# Patient Record
Sex: FEMALE | Race: OTHER | Employment: FULL TIME | URBAN - METROPOLITAN AREA
[De-identification: names, ages, dates, MRNs, and addresses within clinical notes are randomized per-mention and may not be internally consistent; named-entity substitution may affect disease eponyms.]

---

## 2018-04-22 PROBLEM — R74.8 ELEVATED ALKALINE PHOSPHATASE LEVEL: Status: ACTIVE | Noted: 2018-04-22

## 2018-04-22 PROBLEM — F32.9 MAJOR DEPRESSION, SINGLE EPISODE: Status: ACTIVE | Noted: 2018-04-22

## 2018-04-22 PROBLEM — E66.01 MORBID OBESITY (HCC): Status: ACTIVE | Noted: 2018-04-22

## 2018-04-22 PROBLEM — F41.9 ANXIETY DISORDER, UNSPECIFIED: Status: ACTIVE | Noted: 2018-04-22

## 2018-04-22 PROBLEM — F32.2 SEVERE MAJOR DEPRESSION, SINGLE EPISODE, WITHOUT PSYCHOTIC FEATURES (HCC): Status: ACTIVE | Noted: 2018-04-22

## 2018-04-22 NOTE — ED PROVIDER NOTES
Patient Seen in: [facility] Emergency Department    History   Patient presents with:  Eval-P (psychiatric)    Stated Complaint: eval p     HPI    Patient is a 43-year-old female comes emergency room for psychiatric evaluation.   Patient apparently is nondistended  SKIN: Warm and dry  NEUROLOGICAL: No focal deficits  PSYCHIATRIC: Good eye contact. Positive suicidal ideation. No homicidal ideation. No hallucinations. Thought process is logical.  Judgment good. Insight good.   Mood depressed    ED Cou is depressed and suicidal.  Patient needs psychiatric stabilization. Certificate filled out and placed on the chart. Patient will be transferred to psychiatric facility.           Disposition and Plan     Clinical Impression:  Suicidal ideation  (primary

## 2018-04-22 NOTE — ED NOTES
Pt accepted as a direct admission to [facility] per Dr Barrow Rear to room 619-A. Report to [id number].

## 2018-04-22 NOTE — ED INITIAL ASSESSMENT (HPI)
Pt ambulatory to er with family cc told family she did not want to be here anymore and took pills - pt denies taking pills. All belongings removed and placed at nurs station.

## 2018-05-15 NOTE — PATIENT INSTRUCTIONS
Thank you for choosing [facility]  To Do:  FOR Sai Tejeda  1.  have blood tests done  2. Follow up in 3 months for annual physical and PAP  3. Contoinue follow up with psychiatry and therapist  4. Have blood tests done  5.  Arrange for US without your doctor’s OK. Physical illness  Being sick can make anyone feel frustrated and sad. But some health problems may cause actual changes in your brain that lead to depression.  Other health problems, such as an underactive thyroid, may be mistaken experience a crisis. Keep the phone number of a crisis hotline and know the location of your community's urgent care centers and the closest emergency department. · Hold off on big decisions. Depression can cloud your judgment.  So wait until you feel bett a healthy weight is higher than the average weight listed on weight charts. Your healthcare provider can help you decide on a healthy weight for you.     Reasons to lose weight  Losing weight can help with some health problems, such as high blood pressure, reserved. This information is not intended as a substitute for professional medical care. Always follow your healthcare professional's instructions. Weight Management: Take It Off and Keep It Off    It’s easy to be motivated when you first start.  Carole Florentino say Tamar Loge you.”  · Make a list of the things that others like about you and that you like about yourself. Add something new from time to time. Keep this list to look at when you need a lift.   Resources  · The [employer] on [address]

## 2018-05-15 NOTE — PROGRESS NOTES
Chief Complaint:   Patient presents with:  Hospital F/U: [facility] 4/21/18, suicidal attemp    HPI:   This is a 32year old female     FF UP RE: RECENT HOSPITALIZATION   Admitted for depression and SI. Admitted for 5+ days. Now in out patient therapy.  Feels be Allergies  Current Meds:    Current Outpatient Prescriptions on File Prior to Visit:  [DISCONTINUED] Sertraline HCl 50 MG Oral Tab Take 1 tablet (50 mg total) by mouth daily.  Disp: 30 tablet Rfl: 0     No current facility-administered medications on file p noted. Insight and judgment are fair. Impulse control is fair.  The patient is cognitively intact            Recent Results (from the past 1008 hour(s))  -RAINBOW DRAW BLUE   Collection Time: 04/21/18  9:43 PM   Result Value Ref Range   Hold Blue Auto Resul Neutrophil Absolute Prelim 5.76 1.30 - 6.70 x10 (3) uL   Neutrophil Absolute 5.76 1.30 - 6.70 x10(3) uL   Lymphocyte Absolute 2.34 0.90 - 4.00 x10(3) uL   Monocyte Absolute 0.53 0.10 - 1.00 x10(3) uL   Eosinophil Absolute 0.12 0.00 - 0.30 x10(3) uL   [facility] ([facility]  -     COMP METABOLIC PANEL (14); Future  -     HEMOGLOBIN A1C; Future  -     LIPID PANEL; Future    Elevated alkaline phosphatase level  -     US ABDOMEN COMPLETE (CPT=76700);  Future    Vitamin D deficiency  -     VITAMIN D, 25-HYDROXY; Future    Hy

## 2018-06-13 ENCOUNTER — APPOINTMENT (OUTPATIENT)
Dept: LAB | Age: 28
End: 2018-06-13
Attending: EMERGENCY MEDICINE
Payer: COMMERCIAL

## 2018-06-13 ENCOUNTER — HOSPITAL ENCOUNTER (OUTPATIENT)
Dept: ULTRASOUND IMAGING | Age: 28
Discharge: HOME | End: 2018-06-13
Attending: EMERGENCY MEDICINE
Payer: COMMERCIAL

## 2018-06-13 DIAGNOSIS — E66.01 MORBID OBESITY (HCC): ICD-10-CM

## 2018-06-13 DIAGNOSIS — E87.6 HYPOKALEMIA: ICD-10-CM

## 2018-06-13 DIAGNOSIS — R74.8 ELEVATED ALKALINE PHOSPHATASE LEVEL: ICD-10-CM

## 2018-06-13 DIAGNOSIS — E55.9 VITAMIN D DEFICIENCY: ICD-10-CM

## 2018-06-13 PROCEDURE — 76700 US EXAM ABDOM COMPLETE: CPT | Performed by: EMERGENCY MEDICINE

## 2018-06-13 PROCEDURE — 82306 VITAMIN D 25 HYDROXY: CPT | Performed by: EMERGENCY MEDICINE

## 2018-06-13 PROCEDURE — 80053 COMPREHEN METABOLIC PANEL: CPT | Performed by: EMERGENCY MEDICINE

## 2018-06-13 PROCEDURE — 80061 LIPID PANEL: CPT | Performed by: EMERGENCY MEDICINE

## 2018-06-13 PROCEDURE — 36415 COLL VENOUS BLD VENIPUNCTURE: CPT | Performed by: EMERGENCY MEDICINE

## 2018-06-13 PROCEDURE — 83036 HEMOGLOBIN GLYCOSYLATED A1C: CPT | Performed by: EMERGENCY MEDICINE

## 2018-06-15 ENCOUNTER — TELEPHONE (OUTPATIENT)
Dept: FAMILY MEDICINE CLINIC | Facility: CLINIC | Age: 28
End: 2018-06-15

## 2018-06-15 PROBLEM — F32.2 MAJOR DEPRESSIVE DISORDER, SINGLE EPISODE, SEVERE WITHOUT PSYCHOSIS (HCC): Status: ACTIVE | Noted: 2018-04-22

## 2018-06-15 NOTE — TELEPHONE ENCOUNTER
----- Message from Michela Sever, DO sent at 6/13/2018  9:46 PM CDT -----  Needs OV in 2 weeks with pcp to address labs and  Treatment plan- prediabetes, low HDL, vit D insufficiency. Please call.       US ABDOMEN COMPLETE (CPT=76700)   Order: [id number]   S

## 2018-06-15 NOTE — TELEPHONE ENCOUNTER
Detailed VM left for patient. I advised her that she should make a follow up appointment in the next 2 weeks to discuss her ultrasound and test results. I explained the test results and the labs including pre-diabetes.  I explained that all results will be

## 2018-07-09 ENCOUNTER — TELEPHONE (OUTPATIENT)
Dept: FAMILY MEDICINE CLINIC | Facility: CLINIC | Age: 28
End: 2018-07-09

## 2018-07-09 NOTE — TELEPHONE ENCOUNTER
LVM for patient that appointment was missed today with  for follow up, no show fee policy $10.21 and phone number hours were left on message.

## 2021-06-18 NOTE — H&P
[address] [facility]  Obstetrics and Gynecology   History & Physical  NEW    Chief complaint: Patient presents with:  Wellness Visit: Pt has been trying to conceive for 4 years now. 2 Miscarriages the last 2 years. Other: Difficulty conceiving.  Mario Asif active. Uses lubricant due to a lot of dryness. Hard time with libido. No dyspareunia. Contraception: withdrawal currently   STDs: no   HPV vaccine - yes     Pap 2+ years ago. No abn pap.    Mammogram - N   Colonoscopy - N     PCP: Sampson Rinne, MD 02/2020 7w0d    SAB   FD      Birth Comments: Passed the tissue on own. D&C.    1 Ectopic 2019              Birth Comments: Left tube. Methotrexate.         Meds:  tretinoin 0.025 % External Cream, APPLY A THIN LAYER TO FACE AND CHEST EVERY NIGHT AT BEDTIME (Medical):       Lack of Transportation (Non-Medical):   Physical Activity:       Days of Exercise per Week:       Minutes of Exercise per Session:   Stress:       Feeling of Stress :   Social Connections:       Frequency of Communication with Friends and exhibits no mass. Left breast exhibits no mass. no palpable masses  Negative nipple discharge or skin changes. Negative axillary lymphadenopathy. Abdominal: Soft. She exhibits no distension and no mass. There is no abdominal tenderness.  There is no rebo B; Future  -     THINPREP PAP SMEAR B; Future  -     HPV HIGH RISK , THIN PREP COLLECTION;  Future    General counseling and advice for contraceptive management    HPV vaccine counseling    History of miscarriage  -     ANTICARDIOLIPIN AB, IGG, QN  -     BE

## 2021-06-19 ENCOUNTER — OFFICE VISIT (OUTPATIENT)
Dept: OBGYN CLINIC | Facility: CLINIC | Age: 31
End: 2021-06-19
Payer: COMMERCIAL

## 2021-06-19 VITALS
WEIGHT: 291 LBS | HEIGHT: 66 IN | HEART RATE: 95 BPM | SYSTOLIC BLOOD PRESSURE: 124 MMHG | BODY MASS INDEX: 46.77 KG/M2 | DIASTOLIC BLOOD PRESSURE: 76 MMHG

## 2021-06-19 DIAGNOSIS — Z71.85 HPV VACCINE COUNSELING: ICD-10-CM

## 2021-06-19 DIAGNOSIS — Z87.59 HISTORY OF MISCARRIAGE: ICD-10-CM

## 2021-06-19 DIAGNOSIS — E66.01 MORBID OBESITY WITH BMI OF 45.0-49.9, ADULT (HCC): ICD-10-CM

## 2021-06-19 DIAGNOSIS — Z30.09 GENERAL COUNSELING AND ADVICE FOR CONTRACEPTIVE MANAGEMENT: ICD-10-CM

## 2021-06-19 DIAGNOSIS — N97.9 INFERTILITY, FEMALE, SECONDARY: ICD-10-CM

## 2021-06-19 DIAGNOSIS — F32.A DEPRESSION, UNSPECIFIED DEPRESSION TYPE: ICD-10-CM

## 2021-06-19 DIAGNOSIS — L83 ACANTHOSIS NIGRICANS: ICD-10-CM

## 2021-06-19 DIAGNOSIS — Z01.419 WELL WOMAN EXAM WITH ROUTINE GYNECOLOGICAL EXAM: Primary | ICD-10-CM

## 2021-06-19 DIAGNOSIS — Z12.4 SCREENING FOR CERVICAL CANCER: ICD-10-CM

## 2021-06-19 DIAGNOSIS — F43.21 GRIEF REACTION: ICD-10-CM

## 2021-06-19 DIAGNOSIS — F41.9 ANXIETY: ICD-10-CM

## 2021-06-19 DIAGNOSIS — N92.0 MENORRHAGIA WITH REGULAR CYCLE: ICD-10-CM

## 2021-06-19 PROCEDURE — 87491 CHLMYD TRACH DNA AMP PROBE: CPT | Performed by: OBSTETRICS & GYNECOLOGY

## 2021-06-19 PROCEDURE — 87591 N.GONORRHOEAE DNA AMP PROB: CPT | Performed by: OBSTETRICS & GYNECOLOGY

## 2021-06-19 PROCEDURE — 3074F SYST BP LT 130 MM HG: CPT | Performed by: OBSTETRICS & GYNECOLOGY

## 2021-06-19 PROCEDURE — 88175 CYTOPATH C/V AUTO FLUID REDO: CPT | Performed by: OBSTETRICS & GYNECOLOGY

## 2021-06-19 PROCEDURE — 87624 HPV HI-RISK TYP POOLED RSLT: CPT | Performed by: OBSTETRICS & GYNECOLOGY

## 2021-06-19 PROCEDURE — 3008F BODY MASS INDEX DOCD: CPT | Performed by: OBSTETRICS & GYNECOLOGY

## 2021-06-19 PROCEDURE — 81025 URINE PREGNANCY TEST: CPT | Performed by: OBSTETRICS & GYNECOLOGY

## 2021-06-19 PROCEDURE — 99385 PREV VISIT NEW AGE 18-39: CPT | Performed by: OBSTETRICS & GYNECOLOGY

## 2021-06-19 PROCEDURE — 99204 OFFICE O/P NEW MOD 45 MIN: CPT | Performed by: OBSTETRICS & GYNECOLOGY

## 2021-06-19 PROCEDURE — 3078F DIAST BP <80 MM HG: CPT | Performed by: OBSTETRICS & GYNECOLOGY

## 2021-06-19 RX ORDER — MINOCYCLINE HYDROCHLORIDE 100 MG/1
100 CAPSULE ORAL 2 TIMES DAILY
COMMUNITY
Start: 2021-05-24

## 2021-06-19 RX ORDER — TRETINOIN 0.025 %
CREAM (GRAM) TOPICAL
COMMUNITY
Start: 2021-05-24 | End: 2021-09-03

## 2021-06-19 NOTE — PATIENT INSTRUCTIONS
Preconception advice:     Track all periods & menstrual bleeding  Can try using an over the counter ovulation predictor kit to see if it looks like you are ovulating, or you can have a day 21-24 progesterone level drawn through the lab.      Please have you

## 2021-07-07 ENCOUNTER — HOSPITAL ENCOUNTER (OUTPATIENT)
Dept: ULTRASOUND IMAGING | Age: 31
Discharge: HOME | End: 2021-07-07
Attending: OBSTETRICS & GYNECOLOGY
Payer: COMMERCIAL

## 2021-07-07 DIAGNOSIS — N97.9 INFERTILITY, FEMALE, SECONDARY: ICD-10-CM

## 2021-07-07 PROBLEM — N83.201 RIGHT OVARIAN CYST: Status: ACTIVE | Noted: 2021-07-07

## 2021-07-07 PROCEDURE — 3044F HG A1C LEVEL LT 7.0%: CPT | Performed by: FAMILY MEDICINE

## 2021-07-07 PROCEDURE — 76830 TRANSVAGINAL US NON-OB: CPT | Performed by: OBSTETRICS & GYNECOLOGY

## 2021-07-07 PROCEDURE — 76856 US EXAM PELVIC COMPLETE: CPT | Performed by: OBSTETRICS & GYNECOLOGY

## 2021-07-07 PROCEDURE — 93975 VASCULAR STUDY: CPT | Performed by: OBSTETRICS & GYNECOLOGY

## 2021-07-08 NOTE — PROGRESS NOTES
Pelvic US images & report reviewed. Pelvic US done on 7/7/2021 would have been cycle day 4 by given LMP 7/3/2021. There is a 3.4 cm simple cyst on the right ovary.  This is discordant from the period and is suggestive of ovulatory dysfunction & anovulatory

## 2021-07-08 NOTE — PROGRESS NOTES
See previous note. Would also recheck pelvic US in about 6-8 wk to make sure right ovarian cyst resolves.

## 2021-07-09 NOTE — PROGRESS NOTES
Bola Hudson,   Please call the office to go over your pelvic ultrasound results. Our number is [phone].    Thanks,   John Diggs RN

## 2021-07-10 LAB
ABSOLUTE BASOPHILS: 30 CELLS/UL (ref 0–200)
ABSOLUTE EOSINOPHILS: 122 CELLS/UL (ref 15–500)
ABSOLUTE LYMPHOCYTES: 1832 CELLS/UL (ref 850–3900)
ABSOLUTE MONOCYTES: 365 CELLS/UL (ref 200–950)
ABSOLUTE NEUTROPHILS: 5252 CELLS/UL (ref 1500–7800)
ALBUMIN/GLOBULIN RATIO: 1.1 (CALC) (ref 1–2.5)
ALBUMIN: 4.1 G/DL (ref 3.6–5.1)
ALKALINE PHOSPHATASE: 98 U/L (ref 31–125)
ALT: 64 U/L (ref 6–29)
ANTI-MULLERIAN HORMONE (AMH), FEMALE: 1.1 NG/ML (ref 0.36–10.07)
AST: 36 U/L (ref 10–30)
B2 GLYCOPROTEIN I (IGG)AB: <2 U/ML
BASOPHILS: 0.4 %
BILIRUBIN, TOTAL: 0.6 MG/DL (ref 0.2–1.2)
BUN: 9 MG/DL (ref 7–25)
CALCIUM: 9.2 MG/DL (ref 8.6–10.2)
CARBON DIOXIDE: 27 MMOL/L (ref 20–32)
CARDIOLIPIN AB (IGG): <2 GPL-U/ML
CHLORIDE: 102 MMOL/L (ref 98–110)
CHOL/HDLC RATIO: 5.6 (CALC)
CHOLESTEROL, TOTAL: 167 MG/DL
CREATININE: 0.66 MG/DL (ref 0.5–1.1)
DHEA SULFATE: 96 MCG/DL (ref 23–266)
DRVVT SCREEN: 32 SEC
EGFR IF AFRICN AM: 135 ML/MIN/1.73M2
EGFR IF NONAFRICN AM: 117 ML/MIN/1.73M2
EOSINOPHILS: 1.6 %
FREE TESTOSTERONE: 1 PG/ML (ref 0.1–6.4)
FSH: 5.9 MIU/ML
GLOBULIN: 3.6 G/DL (CALC) (ref 1.9–3.7)
GLUCOSE: 95 MG/DL (ref 65–99)
HDL CHOLESTEROL: 30 MG/DL
HEMATOCRIT: 38.5 % (ref 35–45)
HEMOGLOBIN A1C: 5.7 % OF TOTAL HGB
HEMOGLOBIN: 12.3 G/DL (ref 11.7–15.5)
LDL-CHOLESTEROL: 111 MG/DL (CALC)
LYMPHOCYTES: 24.1 %
MCH: 23.9 PG (ref 27–33)
MCHC: 31.9 G/DL (ref 32–36)
MCV: 74.8 FL (ref 80–100)
MONOCYTES: 4.8 %
MPV: 11.5 FL (ref 7.5–12.5)
NEUTROPHILS: 69.1 %
NON-HDL CHOLESTEROL: 137 MG/DL (CALC)
PLATELET COUNT: 309 THOUSAND/UL (ref 140–400)
POTASSIUM: 4 MMOL/L (ref 3.5–5.3)
PROGESTERONE: <0.5 NG/ML
PROLACTIN: 10.5 NG/ML
PROTEIN, TOTAL: 7.7 G/DL (ref 6.1–8.1)
PTT-LA SCREEN: 34 SEC
RDW: 15.7 % (ref 11–15)
RED BLOOD CELL COUNT: 5.15 MILLION/UL (ref 3.8–5.1)
SODIUM: 137 MMOL/L (ref 135–146)
TESTOSTERONE, TOTAL,$/MS/MS: 8 NG/DL (ref 2–45)
TRIGLYCERIDES: 148 MG/DL
TSH W/REFLEX TO FT4: 1.94 MIU/L
WHITE BLOOD CELL COUNT: 7.6 THOUSAND/UL (ref 3.8–10.8)

## 2021-07-12 ENCOUNTER — TELEPHONE (OUTPATIENT)
Dept: OBGYN CLINIC | Facility: CLINIC | Age: 31
End: 2021-07-12

## 2021-07-12 PROBLEM — R73.03 PREDIABETES: Status: ACTIVE | Noted: 2021-07-07

## 2021-07-12 PROBLEM — R79.89 ELEVATED LFTS: Status: ACTIVE | Noted: 2021-07-07

## 2021-07-12 PROBLEM — E78.5 DYSLIPIDEMIA: Status: ACTIVE | Noted: 2021-07-07

## 2021-07-12 NOTE — PROGRESS NOTES
Can start metformin. Will send Rx to pharmacy. Endometrial biopsy is just done in office. Will also send Rx Provera (a progestin) to take for 10 days each month to induce a withdrawal bleed to shed the lining.

## 2021-07-12 NOTE — PROGRESS NOTES
Prediabetes noted. Elevated liver enzymes. High LDL & low HDL cholesterol. Rest of labs are ok. See primary care physician. Start low carb/diabetic diet.

## 2021-07-12 NOTE — PROGRESS NOTES
Spoke with patient regarding results. She had blood work completed. Patient aware of blood test results and recommendation for diet, exercise and follow up with PCP regarding prediabetes, high LDL and elevated liver enzymes.  Patient verbalizes understandin

## 2021-07-13 ENCOUNTER — TELEPHONE (OUTPATIENT)
Dept: OBGYN CLINIC | Facility: CLINIC | Age: 31
End: 2021-07-13

## 2021-07-13 NOTE — PROGRESS NOTES
Spoke with patient let her know that Metformin and Provera would be available for her to  from the pharmacy. Advised that the endometrial biopsy can be done in the office. Patient verbalized understanding.  [facility]

## 2021-07-23 ENCOUNTER — TELEPHONE (OUTPATIENT)
Dept: INTERNAL MEDICINE CLINIC | Facility: CLINIC | Age: 31
End: 2021-07-23

## 2021-09-03 ENCOUNTER — OFFICE VISIT (OUTPATIENT)
Dept: FAMILY MEDICINE CLINIC | Facility: CLINIC | Age: 31
End: 2021-09-03
Payer: COMMERCIAL

## 2021-09-03 VITALS
HEART RATE: 73 BPM | DIASTOLIC BLOOD PRESSURE: 72 MMHG | WEIGHT: 290.25 LBS | HEIGHT: 66 IN | TEMPERATURE: 97 F | BODY MASS INDEX: 46.65 KG/M2 | RESPIRATION RATE: 16 BRPM | SYSTOLIC BLOOD PRESSURE: 110 MMHG | OXYGEN SATURATION: 99 %

## 2021-09-03 DIAGNOSIS — E66.01 MORBID OBESITY WITH BMI OF 45.0-49.9, ADULT (HCC): ICD-10-CM

## 2021-09-03 DIAGNOSIS — R73.03 PREDIABETES: ICD-10-CM

## 2021-09-03 DIAGNOSIS — R79.89 ELEVATED LFTS: Primary | ICD-10-CM

## 2021-09-03 DIAGNOSIS — E78.5 BORDERLINE HYPERLIPIDEMIA: ICD-10-CM

## 2021-09-03 PROBLEM — O00.90 ECTOPIC PREGNANCY (HCC): Status: ACTIVE | Noted: 2021-09-03

## 2021-09-03 PROBLEM — O00.90 ECTOPIC PREGNANCY: Status: ACTIVE | Noted: 2021-09-03

## 2021-09-03 PROBLEM — E78.00 HYPERCHOLESTEROLEMIA: Status: ACTIVE | Noted: 2021-09-03

## 2021-09-03 PROBLEM — E11.9 DIABETES MELLITUS (HCC): Status: ACTIVE | Noted: 2021-09-03

## 2021-09-03 PROCEDURE — 3078F DIAST BP <80 MM HG: CPT | Performed by: FAMILY MEDICINE

## 2021-09-03 PROCEDURE — 3008F BODY MASS INDEX DOCD: CPT | Performed by: FAMILY MEDICINE

## 2021-09-03 PROCEDURE — 3074F SYST BP LT 130 MM HG: CPT | Performed by: FAMILY MEDICINE

## 2021-09-03 PROCEDURE — 99214 OFFICE O/P EST MOD 30 MIN: CPT | Performed by: FAMILY MEDICINE

## 2021-09-03 NOTE — PATIENT INSTRUCTIONS
Controlling Your Cholesterol  Cholesterol is a waxy substance. It travels in your blood through the blood vessels. When you have high cholesterol, it can build up along the walls of the blood vessels.  This makes the vessels narrower and decreases blood f levels, another form of fat in the blood. If you are pregnant or thinking of becoming pregnant or are breastfeeding, talk with your healthcare provider for advice about the best fish choices and how much to eat.   · Eat more whole grains and soluble fiber ( for developing type 2 diabetes. Type 2 diabetes is diagnosed when the level of glucose in the blood reaches a certain high level. With prediabetes, it hasn’t reached this point yet.  But it's higher than normal. It is vital to make lifestyle changes to lowe normal test result is 139 milligrams per deciliter (mg/dL) or lower. Prediabetes is 140 mg/dL to 199 mg/dL. Diabetes is 200 mg/dL or higher. · Hemoglobin A1c (HbA1c). Your HbA1c is normal if it is below 5.7%. Prediabetes is 5.7% to 6.4%.  Diabetes is 6.5% last reviewed this educational content on 6/1/2019  © 0516-2605 [employer]. All rights reserved. This information is not intended as a substitute for professional medical care. Always follow your healthcare professional's instructions.

## 2021-09-03 NOTE — PROGRESS NOTES
HPI/Subjective:   Patient ID: Teresa Lemus is a 33year old female. HPI   31yr obese, female presents as a new patient to f/u on labs done by her gyne.    Prediabetes, was started on metformin by her gyne and states she is taking metformin 1500mg p Pulmonary effort is normal.      Breath sounds: Normal breath sounds. Abdominal:      General: Bowel sounds are normal.      Palpations: Abdomen is soft. Tenderness: There is no abdominal tenderness. There is no guarding or rebound.       Comments: B

## 2021-09-24 LAB
ALBUMIN/GLOBULIN RATIO: 1.2 (CALC) (ref 1–2.5)
ALBUMIN: 4.2 G/DL (ref 3.6–5.1)
ALKALINE PHOSPHATASE: 94 U/L (ref 31–125)
ALT: 74 U/L (ref 6–29)
AST: 36 U/L (ref 10–30)
BILIRUBIN, DIRECT: 0.1 MG/DL
BILIRUBIN, INDIRECT: 0.6 MG/DL (CALC) (ref 0.2–1.2)
BILIRUBIN, TOTAL: 0.7 MG/DL (ref 0.2–1.2)
GLOBULIN: 3.4 G/DL (CALC) (ref 1.9–3.7)
PROTEIN, TOTAL: 7.6 G/DL (ref 6.1–8.1)
SIGNAL TO CUT-OFF: 0.01

## 2023-10-16 ENCOUNTER — OFFICE VISIT (OUTPATIENT)
Dept: OBGYN CLINIC | Facility: CLINIC | Age: 33
End: 2023-10-16

## 2023-10-16 VITALS
WEIGHT: 260.38 LBS | BODY MASS INDEX: 41.85 KG/M2 | HEIGHT: 66 IN | SYSTOLIC BLOOD PRESSURE: 122 MMHG | DIASTOLIC BLOOD PRESSURE: 70 MMHG

## 2023-10-16 DIAGNOSIS — O09.291 PRIOR MISCARRIAGE WITH PREGNANCY IN FIRST TRIMESTER, ANTEPARTUM: ICD-10-CM

## 2023-10-16 DIAGNOSIS — O09.11 PRIOR ECTOPIC PREGNANCY, ANTEPARTUM, FIRST TRIMESTER: ICD-10-CM

## 2023-10-16 DIAGNOSIS — N92.6 MISSED MENSES: Primary | ICD-10-CM

## 2023-10-16 DIAGNOSIS — N91.2 AMENORRHEA: ICD-10-CM

## 2023-10-16 LAB
CONTROL LINE PRESENT WITH A CLEAR BACKGROUND (YES/NO): YES YES/NO
KIT LOT #: NORMAL NUMERIC
PREGNANCY TEST, URINE: POSITIVE

## 2023-10-16 PROCEDURE — 3008F BODY MASS INDEX DOCD: CPT | Performed by: OBSTETRICS & GYNECOLOGY

## 2023-10-16 PROCEDURE — 3078F DIAST BP <80 MM HG: CPT | Performed by: OBSTETRICS & GYNECOLOGY

## 2023-10-16 PROCEDURE — 99204 OFFICE O/P NEW MOD 45 MIN: CPT | Performed by: OBSTETRICS & GYNECOLOGY

## 2023-10-16 PROCEDURE — 3074F SYST BP LT 130 MM HG: CPT | Performed by: OBSTETRICS & GYNECOLOGY

## 2023-10-16 PROCEDURE — 81025 URINE PREGNANCY TEST: CPT | Performed by: OBSTETRICS & GYNECOLOGY

## 2023-10-16 RX ORDER — ASCORBIC ACID, CHOLECALCIFEROL, .ALPHA.-TOCOPHEROL ACETATE, DL-, PYRIDOXINE HYDROCHLORIDE, FOLIC ACID, CYANOCOBALAMIN, BIOTIN, CALCIUM CARBONATE, FERROUS ASPARTO GLYCINATE, IRON, POTASSIUM IODIDE, MAGNESIUM OXIDE, DOCONEXENT AND LOWBUSH BLUEBERRY 60; 1000; 10; 26; 400; 13; 280; 80; 9; 9; 150; 25; 350; 25; 600 MG/1; [IU]/1; [IU]/1; MG/1; UG/1; UG/1; UG/1; MG/1; MG/1; MG/1; UG/1; MG/1; MG/1; MG/1; UG/1
1 CAPSULE, GELATIN COATED ORAL DAILY
COMMUNITY
Start: 2023-10-12

## 2023-10-16 RX ORDER — ERGOCALCIFEROL 1.25 MG/1
50000 CAPSULE ORAL WEEKLY
COMMUNITY
Start: 2023-09-28

## 2023-10-17 ENCOUNTER — TELEPHONE (OUTPATIENT)
Dept: OBGYN CLINIC | Facility: CLINIC | Age: 33
End: 2023-10-17

## 2023-10-17 DIAGNOSIS — Z87.59 HISTORY OF ECTOPIC PREGNANCY: Primary | ICD-10-CM

## 2023-10-17 DIAGNOSIS — N92.6 MISSED MENSES: ICD-10-CM

## 2023-10-17 NOTE — TELEPHONE ENCOUNTER
Pt was seen yesterday and told to schedule an US, pt called to schedule and no order.  Please advise

## 2023-10-31 ENCOUNTER — ROUTINE PRENATAL (OUTPATIENT)
Dept: OBGYN CLINIC | Facility: CLINIC | Age: 33
End: 2023-10-31
Payer: COMMERCIAL

## 2023-10-31 ENCOUNTER — HOSPITAL ENCOUNTER (OUTPATIENT)
Dept: ULTRASOUND IMAGING | Age: 33
Discharge: HOME | End: 2023-10-31
Attending: OBSTETRICS & GYNECOLOGY
Payer: COMMERCIAL

## 2023-10-31 ENCOUNTER — NURSE ONLY (OUTPATIENT)
Dept: OBGYN CLINIC | Facility: CLINIC | Age: 33
End: 2023-10-31

## 2023-10-31 VITALS — DIASTOLIC BLOOD PRESSURE: 78 MMHG | BODY MASS INDEX: 45 KG/M2 | SYSTOLIC BLOOD PRESSURE: 124 MMHG | WEIGHT: 280 LBS

## 2023-10-31 DIAGNOSIS — Z32.01 PREGNANCY TEST POSITIVE: Primary | ICD-10-CM

## 2023-10-31 DIAGNOSIS — N92.6 MISSED MENSES: ICD-10-CM

## 2023-10-31 DIAGNOSIS — Z34.81 ENCOUNTER FOR SUPERVISION OF OTHER NORMAL PREGNANCY IN FIRST TRIMESTER: Primary | ICD-10-CM

## 2023-10-31 DIAGNOSIS — Z87.59 HISTORY OF ECTOPIC PREGNANCY: ICD-10-CM

## 2023-10-31 PROCEDURE — 76801 OB US < 14 WKS SINGLE FETUS: CPT | Performed by: OBSTETRICS & GYNECOLOGY

## 2023-10-31 NOTE — PROGRESS NOTES
Narrative   PROCEDURE:  US PREG 1ST TRIMESTER (CPT=76801)     COMPARISON:  None. INDICATIONS:  N92.6 Missed menses Z87.59 History of ectopic pregnancy     TECHNIQUE:  Transabdominal pelvic ultrasound examination was performed. PATIENT STATED HISTORY: (As transcribed by Technologist)  Patient states missed menses. FINDINGS:    GESTATIONAL SAC:  Present and normal appearing. FETAL POLE:  Present and normal appearing. YOLK SAC:  Present. CARDIAC ACTIVITY:  Present. UTERUS:  Normal.     RIGHT OVARY:  Normal.  LEFT OVARY:  Normal.  CUL-DE-SAC:  Normal.  CLINICAL AGE:  7 weeks 3 days  SONOGRAPHIC AGE:  7 weeks 0 days  OTHER:  Negative. Impression   CONCLUSION:  Single live intrauterine gestation with sonographic age of 9 weeks, 0 days by crown-rump length. LOCATION:  [id number]           Dictated by (CST): Ruben Hutchinson MD on 10/31/2023 at 2:35 PM      Finalized by (CST): Ruben Hutchinson MD on 10/31/2023 at 2:43 PM       Pregnancy counseilng done. All questions answered. Pt considering genetic testing. Hx of ectopic pregnancy. prior pregnancy

## 2023-10-31 NOTE — PROGRESS NOTES
Pt called today for RN [facility] Education. Missed menses apt with: ANITA  LMP: 2023    Pre  BMI: 41.99  EPDS score: 3/30- Declined resources  +UPT at home:  10/7/2023  +UPT in office: 10/16    US: Scheduled  Working TALIA: 2024  Hx of genetic abnormality in family: Denies  Hx of varicella: Chicken Pox in Childhood  Flu Vaccine: Interested  Covid Vaccine: x2     Consent (if needed): n/a      Sterilization/Contraception: Declined     OUD Screening: Pt. Has answered NO 5P questions and has NO  risk factors. Pt. Given What pregnant women need to know handout. Educational material reviewed with patient: Prenatal care, nutrition, weight gain recommendations, travel, exercise, intercourse, pregnancy changes, safe medications, pregnancy and work, fetal movement, labor and  labor, warning signs, food safety, tdap, cord blood, breastfeeding- Breast feeding, circumcision- undecided, and Group B strep. Blood transfusion if needed: Consents  PN labs: Entered    Optional genetic screening labs were reviewed: Kate Naqvi, FTS with US, Quad screen MSAFP and CF screening.    - MFM FTS  [employer] Media Policy: Discussed    NOB apt:  Today

## 2023-12-08 ENCOUNTER — LAB ENCOUNTER (OUTPATIENT)
Dept: LAB | Facility: HOSPITAL | Age: 33
End: 2023-12-08
Attending: OBSTETRICS & GYNECOLOGY
Payer: COMMERCIAL

## 2023-12-08 ENCOUNTER — HOSPITAL ENCOUNTER (OUTPATIENT)
Dept: PERINATAL CARE | Facility: HOSPITAL | Age: 33
Discharge: HOME | End: 2023-12-08
Attending: OBSTETRICS & GYNECOLOGY
Payer: COMMERCIAL

## 2023-12-08 ENCOUNTER — ROUTINE PRENATAL (OUTPATIENT)
Dept: OBGYN CLINIC | Facility: CLINIC | Age: 33
End: 2023-12-08
Payer: COMMERCIAL

## 2023-12-08 VITALS
HEART RATE: 82 BPM | SYSTOLIC BLOOD PRESSURE: 112 MMHG | DIASTOLIC BLOOD PRESSURE: 50 MMHG | BODY MASS INDEX: 43 KG/M2 | WEIGHT: 264 LBS

## 2023-12-08 VITALS — DIASTOLIC BLOOD PRESSURE: 80 MMHG | WEIGHT: 265 LBS | SYSTOLIC BLOOD PRESSURE: 120 MMHG | BODY MASS INDEX: 43 KG/M2

## 2023-12-08 DIAGNOSIS — E66.01 MATERNAL MORBID OBESITY IN FIRST TRIMESTER, ANTEPARTUM (HCC): ICD-10-CM

## 2023-12-08 DIAGNOSIS — F32.89 OTHER DEPRESSION: ICD-10-CM

## 2023-12-08 DIAGNOSIS — Z36.9 FIRST TRIMESTER SCREENING: ICD-10-CM

## 2023-12-08 DIAGNOSIS — O99.211 MATERNAL MORBID OBESITY IN FIRST TRIMESTER, ANTEPARTUM (HCC): ICD-10-CM

## 2023-12-08 DIAGNOSIS — Z34.81 ENCOUNTER FOR SUPERVISION OF OTHER NORMAL PREGNANCY IN FIRST TRIMESTER: ICD-10-CM

## 2023-12-08 DIAGNOSIS — O36.80X0 PREGNANCY WITH UNCERTAIN FETAL VIABILITY, SINGLE OR UNSPECIFIED FETUS: ICD-10-CM

## 2023-12-08 DIAGNOSIS — O09.521 MULTIGRAVIDA OF ADVANCED MATERNAL AGE IN FIRST TRIMESTER: Primary | ICD-10-CM

## 2023-12-08 DIAGNOSIS — E66.01 MORBID OBESITY WITH BMI OF 45.0-49.9, ADULT (HCC): ICD-10-CM

## 2023-12-08 DIAGNOSIS — Z34.81 ENCOUNTER FOR SUPERVISION OF OTHER NORMAL PREGNANCY IN FIRST TRIMESTER: Primary | ICD-10-CM

## 2023-12-08 DIAGNOSIS — R73.03 PREDIABETES: ICD-10-CM

## 2023-12-08 LAB
ANTIBODY SCREEN: NEGATIVE
BASOPHILS # BLD AUTO: 0.02 X10(3) UL (ref 0–0.2)
BASOPHILS NFR BLD AUTO: 0.2 %
BILIRUB UR QL: NEGATIVE
CLARITY UR: CLEAR
DEPRECATED HBV CORE AB SER IA-ACNC: 25.3 NG/ML
DEPRECATED RDW RBC AUTO: 40.4 FL (ref 35.1–46.3)
EOSINOPHIL # BLD AUTO: 0.07 X10(3) UL (ref 0–0.7)
EOSINOPHIL NFR BLD AUTO: 0.7 %
ERYTHROCYTE [DISTWIDTH] IN BLOOD BY AUTOMATED COUNT: 14.8 % (ref 11–15)
GLUCOSE UR-MCNC: NORMAL MG/DL
HBV SURFACE AG SER-ACNC: <0.1 [IU]/L
HBV SURFACE AG SERPL QL IA: NONREACTIVE
HCT VFR BLD AUTO: 36.9 %
HCV AB SERPL QL IA: NONREACTIVE
HGB BLD-MCNC: 12.3 G/DL
HGB UR QL STRIP.AUTO: NEGATIVE
IMM GRANULOCYTES # BLD AUTO: 0.02 X10(3) UL (ref 0–1)
IMM GRANULOCYTES NFR BLD: 0.2 %
KETONES UR-MCNC: NEGATIVE MG/DL
LEUKOCYTE ESTERASE UR QL STRIP.AUTO: 250
LYMPHOCYTES # BLD AUTO: 1.78 X10(3) UL (ref 1–4)
LYMPHOCYTES NFR BLD AUTO: 18.9 %
MCH RBC QN AUTO: 25.1 PG (ref 26–34)
MCHC RBC AUTO-ENTMCNC: 33.3 G/DL (ref 31–37)
MCV RBC AUTO: 75.2 FL
MONOCYTES # BLD AUTO: 0.5 X10(3) UL (ref 0.1–1)
MONOCYTES NFR BLD AUTO: 5.3 %
NEUTROPHILS # BLD AUTO: 7.03 X10 (3) UL (ref 1.5–7.7)
NEUTROPHILS # BLD AUTO: 7.03 X10(3) UL (ref 1.5–7.7)
NEUTROPHILS NFR BLD AUTO: 74.7 %
NITRITE UR QL STRIP.AUTO: NEGATIVE
PH UR: 5.5 [PH] (ref 5–8)
PLATELET # BLD AUTO: 272 10(3)UL (ref 150–450)
PROT UR-MCNC: NEGATIVE MG/DL
RBC # BLD AUTO: 4.91 X10(6)UL
RH BLOOD TYPE: POSITIVE
RUBV IGG SER QL: POSITIVE
RUBV IGG SER-ACNC: 23 IU/ML (ref 10–?)
SP GR UR STRIP: 1.01 (ref 1–1.03)
TSI SER-ACNC: 1.58 MIU/ML (ref 0.55–4.78)
UROBILINOGEN UR STRIP-ACNC: NORMAL
WBC # BLD AUTO: 9.4 X10(3) UL (ref 4–11)

## 2023-12-08 PROCEDURE — 87624 HPV HI-RISK TYP POOLED RSLT: CPT | Performed by: OBSTETRICS & GYNECOLOGY

## 2023-12-08 PROCEDURE — 87077 CULTURE AEROBIC IDENTIFY: CPT

## 2023-12-08 PROCEDURE — 36415 COLL VENOUS BLD VENIPUNCTURE: CPT

## 2023-12-08 PROCEDURE — 87491 CHLMYD TRACH DNA AMP PROBE: CPT | Performed by: OBSTETRICS & GYNECOLOGY

## 2023-12-08 PROCEDURE — 86762 RUBELLA ANTIBODY: CPT

## 2023-12-08 PROCEDURE — 87591 N.GONORRHOEAE DNA AMP PROB: CPT | Performed by: OBSTETRICS & GYNECOLOGY

## 2023-12-08 PROCEDURE — 86900 BLOOD TYPING SEROLOGIC ABO: CPT

## 2023-12-08 PROCEDURE — 85025 COMPLETE CBC W/AUTO DIFF WBC: CPT

## 2023-12-08 PROCEDURE — 86780 TREPONEMA PALLIDUM: CPT

## 2023-12-08 PROCEDURE — 87340 HEPATITIS B SURFACE AG IA: CPT

## 2023-12-08 PROCEDURE — 76801 OB US < 14 WKS SINGLE FETUS: CPT | Performed by: OBSTETRICS & GYNECOLOGY

## 2023-12-08 PROCEDURE — 87186 SC STD MICRODIL/AGAR DIL: CPT

## 2023-12-08 PROCEDURE — 86850 RBC ANTIBODY SCREEN: CPT

## 2023-12-08 PROCEDURE — 81001 URINALYSIS AUTO W/SCOPE: CPT

## 2023-12-08 PROCEDURE — 87389 HIV-1 AG W/HIV-1&-2 AB AG IA: CPT

## 2023-12-08 PROCEDURE — 86803 HEPATITIS C AB TEST: CPT

## 2023-12-08 PROCEDURE — 87086 URINE CULTURE/COLONY COUNT: CPT

## 2023-12-08 PROCEDURE — 84443 ASSAY THYROID STIM HORMONE: CPT

## 2023-12-08 PROCEDURE — 86901 BLOOD TYPING SEROLOGIC RH(D): CPT

## 2023-12-08 PROCEDURE — 82728 ASSAY OF FERRITIN: CPT

## 2023-12-08 NOTE — PROGRESS NOTES
Pregnnacy counseling. New ob    Unable to obtain fhts by doppler, pt anxious, pt requested to check for viability,      Fhts 140,  pt reassured.

## 2023-12-09 ENCOUNTER — TELEPHONE (OUTPATIENT)
Dept: OBGYN CLINIC | Facility: CLINIC | Age: 33
End: 2023-12-09

## 2023-12-09 RX ORDER — AMOXICILLIN 500 MG/1
500 CAPSULE ORAL 3 TIMES DAILY
Qty: 21 CAPSULE | Refills: 0 | Status: SHIPPED | OUTPATIENT
Start: 2023-12-09 | End: 2023-12-16

## 2023-12-09 NOTE — TELEPHONE ENCOUNTER
E coli uti noted, nkda,  rx amox 500  mg tid for 7 days sent. Called pt and left message. Please try calling pt again.

## 2023-12-11 LAB
C TRACH DNA SPEC QL NAA+PROBE: NEGATIVE
HPV I/H RISK 1 DNA SPEC QL NAA+PROBE: NEGATIVE
N GONORRHOEA DNA SPEC QL NAA+PROBE: NEGATIVE
T PALLIDUM AB SER QL: NEGATIVE

## 2023-12-14 ENCOUNTER — TELEPHONE (OUTPATIENT)
Dept: PERINATAL CARE | Facility: HOSPITAL | Age: 33
End: 2023-12-14

## 2023-12-14 NOTE — TELEPHONE ENCOUNTER
Panorama screening results obt  Reviewed by KYLAH CADENA    Results:  low risk  Low Risk for Aneuploidies, triploidy and Monosomy X  Fetal Sex: female  Fetal Fraction: 4.0      My chart message sent  Copy of results sent for scanning into pt record

## 2023-12-28 ENCOUNTER — APPOINTMENT (OUTPATIENT)
Dept: ULTRASOUND IMAGING | Facility: HOSPITAL | Age: 33
End: 2023-12-28
Payer: COMMERCIAL

## 2023-12-28 ENCOUNTER — HOSPITAL ENCOUNTER (EMERGENCY)
Facility: HOSPITAL | Age: 33
Discharge: LEFT BEFORE DISCHARGE | End: 2023-12-28
Payer: COMMERCIAL

## 2023-12-28 VITALS
HEIGHT: 66 IN | OXYGEN SATURATION: 98 % | SYSTOLIC BLOOD PRESSURE: 122 MMHG | RESPIRATION RATE: 16 BRPM | TEMPERATURE: 98 F | HEART RATE: 84 BPM | DIASTOLIC BLOOD PRESSURE: 83 MMHG | BODY MASS INDEX: 42.59 KG/M2 | WEIGHT: 265 LBS

## 2023-12-28 PROCEDURE — 76815 OB US LIMITED FETUS(S): CPT

## 2023-12-28 PROCEDURE — 76811 OB US DETAILED SNGL FETUS: CPT

## 2023-12-29 ENCOUNTER — TELEPHONE (OUTPATIENT)
Dept: OBGYN CLINIC | Facility: CLINIC | Age: 33
End: 2023-12-29
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
## 2023-12-30 NOTE — TELEPHONE ENCOUNTER
I called the patient. States that her back pain is less. I told her that she should have no concern about injuring the baby and that if she has concerns with her back that she should work that out with the emergency room. As far as a baby concern she should not worry. She was happy to hear this.   Call completed

## 2024-01-05 ENCOUNTER — ROUTINE PRENATAL (OUTPATIENT)
Dept: OBGYN CLINIC | Facility: CLINIC | Age: 34
End: 2024-01-05
Payer: COMMERCIAL

## 2024-01-05 VITALS
SYSTOLIC BLOOD PRESSURE: 122 MMHG | BODY MASS INDEX: 42 KG/M2 | HEART RATE: 92 BPM | DIASTOLIC BLOOD PRESSURE: 69 MMHG | WEIGHT: 262 LBS

## 2024-01-05 DIAGNOSIS — Z32.01 PREGNANCY TEST POSITIVE: Primary | ICD-10-CM

## 2024-01-05 DIAGNOSIS — Z34.82 ENCOUNTER FOR SUPERVISION OF OTHER NORMAL PREGNANCY IN SECOND TRIMESTER: ICD-10-CM

## 2024-01-20 ENCOUNTER — LAB ENCOUNTER (OUTPATIENT)
Dept: LAB | Age: 34
End: 2024-01-20
Attending: OBSTETRICS & GYNECOLOGY
Payer: COMMERCIAL

## 2024-01-20 DIAGNOSIS — Z34.81 ENCOUNTER FOR SUPERVISION OF OTHER NORMAL PREGNANCY IN FIRST TRIMESTER: ICD-10-CM

## 2024-01-20 LAB — GLUCOSE 1H P GLC SERPL-MCNC: 99 MG/DL

## 2024-01-20 PROCEDURE — 82950 GLUCOSE TEST: CPT

## 2024-01-20 PROCEDURE — 36415 COLL VENOUS BLD VENIPUNCTURE: CPT

## 2024-01-26 ENCOUNTER — HOSPITAL ENCOUNTER (OUTPATIENT)
Dept: PERINATAL CARE | Facility: HOSPITAL | Age: 34
Discharge: HOME | End: 2024-01-26
Attending: OBSTETRICS & GYNECOLOGY
Payer: COMMERCIAL

## 2024-01-26 VITALS
BODY MASS INDEX: 42 KG/M2 | HEART RATE: 75 BPM | SYSTOLIC BLOOD PRESSURE: 104 MMHG | WEIGHT: 260 LBS | DIASTOLIC BLOOD PRESSURE: 64 MMHG

## 2024-01-26 DIAGNOSIS — Z36.89 ENCOUNTER FOR FETAL ANATOMIC SURVEY: ICD-10-CM

## 2024-01-26 DIAGNOSIS — E66.01 MORBID OBESITY (HCC): ICD-10-CM

## 2024-01-26 DIAGNOSIS — O35.9XX0 FETAL ABNORMALITY AFFECTING MANAGEMENT OF MOTHER, SINGLE OR UNSPECIFIED FETUS: ICD-10-CM

## 2024-01-26 DIAGNOSIS — Z03.75 CERVICAL SHORTENING SUSPECTED BUT NOT FOUND: ICD-10-CM

## 2024-01-26 DIAGNOSIS — R73.03 PREDIABETES: ICD-10-CM

## 2024-01-26 DIAGNOSIS — E66.01 MATERNAL MORBID OBESITY IN SECOND TRIMESTER, ANTEPARTUM (HCC): ICD-10-CM

## 2024-01-26 DIAGNOSIS — O99.212 OBESITY AFFECTING PREGNANCY IN SECOND TRIMESTER, UNSPECIFIED OBESITY TYPE: ICD-10-CM

## 2024-01-26 DIAGNOSIS — Z03.75 SUSPECTED SHORTENING OF CERVIX NOT FOUND: ICD-10-CM

## 2024-01-26 DIAGNOSIS — O09.522 AMA (ADVANCED MATERNAL AGE) MULTIGRAVIDA 35+, SECOND TRIMESTER: ICD-10-CM

## 2024-01-26 DIAGNOSIS — O09.292 PREGNANCY WITH PRIOR ADVERSE OUTCOME, SECOND TRIMESTER: ICD-10-CM

## 2024-01-26 DIAGNOSIS — O09.522 AMA (ADVANCED MATERNAL AGE) MULTIGRAVIDA 35+, SECOND TRIMESTER: Primary | ICD-10-CM

## 2024-01-26 DIAGNOSIS — O99.212 MATERNAL MORBID OBESITY IN SECOND TRIMESTER, ANTEPARTUM (HCC): ICD-10-CM

## 2024-01-26 PROCEDURE — 76811 OB US DETAILED SNGL FETUS: CPT | Performed by: OBSTETRICS & GYNECOLOGY

## 2024-01-26 PROCEDURE — 76817 TRANSVAGINAL US OBSTETRIC: CPT

## 2024-01-26 NOTE — PROGRESS NOTES
Outpatient Maternal-Fetal Medicine Consultation    Dear Dr. Shane    Thank you for requesting ultrasound evaluation and maternal fetal medicine consultation on your patient Kasandra Rodrigues.  As you are aware she is a 33 year old female  with a díaz pregnancy and an Estimated Date of Delivery: 24.  A maternal-fetal medicine f/u is today.  Her prenatal records and labs were reviewed.      HISTORY  OB History    Para Term  AB Living   3 0 0 0 2     SAB IAB Ectopic Multiple Live Births   1 0 1          # Outcome Date GA Lbr Allan/2nd Weight Sex Delivery Anes PTL Lv   3 Current            2 SAB 2020 7w0d    SAB   FD      Birth Comments: Passed the tissue on own. D&C.    1 Ectopic 2019              Birth Comments: Left tube. Methotrexate.       Obstetric Comments   2021 Antiphospholipid Ab testing NEGATIVE.        Allergies:  No Known Allergies   Current Meds:  Current Outpatient Medications   Medication Sig Dispense Refill    Ssupnr-FnAiw-RyYyj-Meth-FA-DHA (PRENATE MINI) 18-0.6-0.4-350 MG Oral Cap Take 1 capsule by mouth daily.      ergocalciferol 1.25 MG (48246 UT) Oral Cap Take 1 capsule (50,000 Units total) by mouth once a week.          HISTORY:  Past Medical History:   Diagnosis Date    Acne     Anxiety     Depression     Depression     Depression prior to infertility issues. Had SI but did not act on it. Got help.     Dyslipidemia 2021    Elevated LFTs 2021    Grief reaction     after miscarriage & ectopic.     History of ectopic pregnancy     Human papilloma virus (HPV) type 9 vaccine administered     Miscarriage 2021 Antiphospholipid Ab testing NEGATIVE.     Morbid obesity with BMI of 45.0-49.9, adult (HCC) 2021    Pap smear for cervical cancer screening 2021    Pap & HPV negative.     Pneumonia due to COVID-19 virus 2020    Prediabetes 2021    A1c 5.7% (21)     Wears glasses       No past surgical history on file.    Family History   Problem Relation Age of Onset    Diabetes Father     Hypertension Father     Other (bipolar) Father     Other (depression/anxiety) Father     No Known Problems Mother     Other (lupus) Maternal Grandfather     Diabetes Paternal Grandmother     Hypertension Paternal Grandmother     Prostate Cancer Paternal Grandfather 70    Prostate Cancer Maternal Uncle 45    Thyroid disease Sister     Breast Cancer Neg     Ovarian Cancer Neg     Colon Cancer Neg     Infertility Neg     Endometriosis Neg     Birth Defects Neg     Genetic Disease Neg     Blood Clotting Disorder Neg     DVT/VTE Neg     Bipolar Disorder Father     Anxiety Father     Cancer Paternal Grandfather     Cancer Maternal Uncle     Depression Paternal Aunt       Social History     Socioeconomic History    Marital status:    Tobacco Use    Smoking status: Never    Smokeless tobacco: Never   Vaping Use    Vaping Use: Never used   Substance and Sexual Activity    Alcohol use: Yes     Comment: socially    Drug use: Never    Sexual activity: Yes     Partners: Male     Comment: Trying to conceive   Social History Narrative    ** Merged History Encounter **          Social Determinants of Health     Financial Resource Strain: Low Risk  (10/30/2023)    Financial Resource Strain     Difficulty of Paying Living Expenses: Not very hard     Med Affordability: No   Food Insecurity: No Food Insecurity (10/30/2023)    Food Insecurity     Food Insecurity: Never true   Transportation Needs: No Transportation Needs (10/30/2023)    Transportation Needs     Lack of Transportation: No   Stress: No Stress Concern Present (10/30/2023)    Stress     Feeling of Stress : No   Housing Stability: Low Risk  (10/30/2023)    Housing Stability     Housing Instability: No          PHYSICAL EXAMINATION:  /64   Pulse 75   Wt 260 lb (117.9 kg)   LMP 09/08/2023 (Exact Date)   BMI 41.97 kg/m²   Physical Exam  Constitutional:       Appearance: Normal appearance.    Abdominal:      Palpations: Abdomen is soft.      Tenderness: There is no abdominal tenderness.   Neurological:      Mental Status: She is alert.   Psychiatric:         Mood and Affect: Mood normal.         Behavior: Behavior normal.         OBSTETRIC ULTRASOUND  The patient had a level II ultrasound today which revealed size consistent with dates with suspected right renal agenesis but an otherwise normal detailed anatomic survey.  Ultrasound Findings:  Single IUP in cephalic presentation.    Placenta is anterior, fundal.   A 3 vessel cord is noted.  Cardiac activity is present at 128 bpm   g ( 0 lb 12 oz);    MVP is 4 cm .     Suspected right renal agenesis.     The cervix was not able to be clearly visualized and appeared short by transabdominal ultrasound, therefore transvaginal ultrasound was performed. Transvaginal US findings: Cervix is short and funneling seen. Cervical length measured 38.0 mm     The fetal measurements are consistent with the established EDC. The nasal bone is present.She understands that ultrasound exam cannot exclude genetic abnormalities and that genetic testing is recommended. The limitations of ultrasound were discussed.  See PACS/Imaging Tab For Complete Ultrasound Report  I interpreted the results and reviewed them with the patient.    DISCUSSION  During her visit we discussed and reviewed the following issues:  ADVANCED MATERNAL AGE    Background  I reviewed with the patient that pregnancies in women of advanced maternal age (35 or older at delivery) are associated with elevated risks. Specifically, there is a higher rate of:  Fetal malformations  Preeclampsia  Gestational diabetes  Intrauterine fetal death   Please see previous MFM detailed discussion.     OBESITY:  Her BMI prior to pregnancy was 41  Please see previous M detailed discussion.           GLUCOSE 1HR OB   Date Value Ref Range Status   01/20/2024 99 See comment mg/dL Final     Comment:     If the plasma  glucose level measured after 1 hour is >=130, 135 or 140 mg/dl proceed to \"Glucose Tolerance, 100 gm (0 hr, 1 hr, 2hr, 3hr), Gestational (ADA)\" test on a separate day, as clinically indicated.           Prevention of Preeclampsia   Please see previous [facility] detailed discussion.     Right renal agenesis is suspected  Some cases of unilateral renal agenesis result from in utero regression of multicystic dysplastic kidneys. These patients have an ipsilateral blind ending ureter. This is an infrequent observation and thus does not explain the etiology of all cases of unilateral renal agenesis, which is likely multifactorial.  The prognosis for patients with one normal kidney is excellent, with a survival rate similar to that of age and sex-matched controls from [place] life tables.  However, proteinuria, hypertension, and renal insufficiency appear to be more common later in life.   Most pediatric urologists recommend that children with a solitary kidney avoid contact sports, although the risk of kidney loss resulting from trauma is less than 1 percent.   Unilateral renal agenesis is associated with a high incidence of nonrenal and urinary tract anomalies including cardiac.  There has been an association with karyotypic abnormalities but some studies have not shown an increase with unilateral agenesis.    We discussed the recommended plan of care based on her  risk factors. Kasandra had her questions answered to her satisfaction.  IMPRESSION:  IUP at 20w0d   AMA low risk NIPT, declines invasive testing  Class 3 obesity   history of depression  Prediabetes  Suspected right renal agenesis      RECOMMENDATIONS:  Continue care with Dr. Shane  Monthly growth ultrasounds starting at 28 weeks, add BPP to each growth ultrasound at 32 weeks and beyond  Weekly NSTs at 34 weeks  Limit weight gain to 11-20 pounds.  Delivery at 39-40 weeks   aspirin 120 mg daily until 37 weeks        Thank you for allowing me to  participate in the care of your patient.  Please do not hesitate to contact me if additional questions or concerns arise.      Yolis Estevez M.D.    30  minutes spent in review of records, patient consultation, documentation and coordination of care.  The relevant clinical matter(s) are summarized above.     Note to patient and family  The 21st Century Cures Act makes medical notes available to patients in the interest of transparency.  However, please be advised that this is a medical document.  It is intended as ttlz-at-wgal communication.  It is written and medical language may contain abbreviations or verbiage that are technical and unfamiliar.  It may appear blunt or direct.  Medical documents are intended to carry relevant information, facts as evident, and the clinical opinion of the practitioner.

## 2024-02-16 ENCOUNTER — ROUTINE PRENATAL (OUTPATIENT)
Dept: OBGYN CLINIC | Facility: CLINIC | Age: 34
End: 2024-02-16
Payer: COMMERCIAL

## 2024-02-16 VITALS — WEIGHT: 263 LBS | SYSTOLIC BLOOD PRESSURE: 117 MMHG | BODY MASS INDEX: 42 KG/M2 | DIASTOLIC BLOOD PRESSURE: 82 MMHG

## 2024-02-16 DIAGNOSIS — Z34.82 ENCOUNTER FOR SUPERVISION OF OTHER NORMAL PREGNANCY IN SECOND TRIMESTER: Primary | ICD-10-CM

## 2024-02-16 LAB
APPEARANCE: CLEAR
BILIRUBIN: NEGATIVE
GLUCOSE (URINE DIPSTICK): NEGATIVE MG/DL
KETONES (URINE DIPSTICK): NEGATIVE MG/DL
MULTISTIX LOT#: ABNORMAL NUMERIC
NITRITE, URINE: NEGATIVE
OCCULT BLOOD: NEGATIVE
PH, URINE: 6 (ref 4.5–8)
PROTEIN (URINE DIPSTICK): NEGATIVE MG/DL
SPECIFIC GRAVITY: 1.01 (ref 1–1.03)
URINE-COLOR: YELLOW
UROBILINOGEN,SEMI-QN: 0.2 MG/DL (ref 0–1.9)

## 2024-02-16 PROCEDURE — 81002 URINALYSIS NONAUTO W/O SCOPE: CPT | Performed by: OBSTETRICS & GYNECOLOGY

## 2024-02-16 NOTE — PROGRESS NOTES
Per mfm noted, suspected right renal agensisis.  All quesitons answered.   Continue mfm surveillance.

## 2024-02-28 ENCOUNTER — PATIENT MESSAGE (OUTPATIENT)
Dept: OBGYN CLINIC | Facility: CLINIC | Age: 34
End: 2024-02-28

## 2024-02-28 RX ORDER — BREAST PUMP
EACH MISCELLANEOUS
Qty: 1 EACH | Refills: 0 | Status: SHIPPED | OUTPATIENT
Start: 2024-02-28

## 2024-02-28 NOTE — TELEPHONE ENCOUNTER
From: Kasandra Rodrigues  To: Lb Shane  Sent: 2/28/2024 10:22 AM CST  Subject: Breast pump prescription     I require a breast pump prescription sent to the following email address or fax #. [phone]

## 2024-03-01 ENCOUNTER — TELEPHONE (OUTPATIENT)
Dept: OBGYN CLINIC | Facility: CLINIC | Age: 34
End: 2024-03-01

## 2024-03-05 ENCOUNTER — ROUTINE PRENATAL (OUTPATIENT)
Dept: OBGYN CLINIC | Facility: CLINIC | Age: 34
End: 2024-03-05
Payer: COMMERCIAL

## 2024-03-05 ENCOUNTER — PATIENT MESSAGE (OUTPATIENT)
Dept: OBGYN CLINIC | Facility: CLINIC | Age: 34
End: 2024-03-05

## 2024-03-05 VITALS
DIASTOLIC BLOOD PRESSURE: 75 MMHG | WEIGHT: 266 LBS | SYSTOLIC BLOOD PRESSURE: 120 MMHG | HEART RATE: 91 BPM | BODY MASS INDEX: 43 KG/M2

## 2024-03-05 DIAGNOSIS — Z34.83 ENCOUNTER FOR SUPERVISION OF OTHER NORMAL PREGNANCY IN THIRD TRIMESTER (HCC): ICD-10-CM

## 2024-03-05 DIAGNOSIS — Z34.82 ENCOUNTER FOR SUPERVISION OF OTHER NORMAL PREGNANCY IN SECOND TRIMESTER (HCC): Primary | ICD-10-CM

## 2024-03-05 LAB
APPEARANCE: CLEAR
BILIRUBIN: NEGATIVE
GLUCOSE (URINE DIPSTICK): NEGATIVE MG/DL
KETONES (URINE DIPSTICK): NEGATIVE MG/DL
MULTISTIX LOT#: ABNORMAL NUMERIC
NITRITE, URINE: NEGATIVE
OCCULT BLOOD: NEGATIVE
PH, URINE: 5 (ref 4.5–8)
SPECIFIC GRAVITY: 1.02 (ref 1–1.03)
URINE-COLOR: YELLOW
UROBILINOGEN,SEMI-QN: 0.2 MG/DL (ref 0–1.9)

## 2024-03-05 PROCEDURE — 81002 URINALYSIS NONAUTO W/O SCOPE: CPT | Performed by: STUDENT IN AN ORGANIZED HEALTH CARE EDUCATION/TRAINING PROGRAM

## 2024-03-05 NOTE — PROGRESS NOTES
[facility]  Obstetrics and Gynecology  Prenatal Visit  Leatha Hand PA-C    HPI   Kasandra Rodrigues is a 33 year old.o.  25w4d weeks.    Presenting today after having fallen this morning coming out of the shower. States she feel and hit her upper stomach just beneath her breasts. Reports good fetal movement. No abdominal cramping or vaginal bleeding. Denies felling lightheaded or dizziness in the moment. Overall feeling well.   OB History     OB History    Para Term  AB Living   3 0 0 0 2     SAB IAB Ectopic Multiple Live Births   1 0 1          # Outcome Date GA Lbr Allan/2nd Weight Sex Delivery Anes PTL Lv   3 Current            2 SAB 2020 7w0d    SAB   FD      Birth Comments: Passed the tissue on own. D&C.    1 Ectopic 2019              Birth Comments: Left tube. Methotrexate.       Obstetric Comments   2021 Antiphospholipid Ab testing NEGATIVE.      Medications     Current Outpatient Medications   Medication Sig Dispense Refill    Misc. Devices (BREAST PUMP) Does not apply Misc Double electric breast pump 1 each 0    Aqzjdj-UsAvd-OdGbq-Meth-FA-DHA (PRENATE MINI) 18-0.6-0.4-350 MG Oral Cap Take 1 capsule by mouth daily.      ergocalciferol 1.25 MG (12205 UT) Oral Cap Take 1 capsule (50,000 Units total) by mouth once a week.       Exam   /75   Pulse 91   Wt 266 lb (120.7 kg)   LMP 2023 (Exact Date)   BMI 42.93 kg/m²   FH: 25  FHTs: 140  Assessment   Kasandra is a 33 year old female  with viable IUP at 25w4d weeks.    ICD-10-CM    1. Encounter for supervision of other normal pregnancy in second trimester ([facility])  Z34.82 POC Urinalysis, Manual Dip without microscopy [[id number]]      2. Encounter for supervision of other normal pregnancy in third trimester ([facility])  Z34.83         Plan   - Strict return precautions discussed. If she notices decreased fetal movement, severe abdominal pain, or vaginal bleeding she is to go to [facility] for monitoring. Patient verbalized  understanding.   - F/u prenatal appointment with Dr. Shane on 3/15.       REAL MARINO PA-C  3:16 PM  3/5/2024

## 2024-03-05 NOTE — TELEPHONE ENCOUNTER
Pt Name and  verified.  Pt fell when coming out of the shower this morning. She hit her belly right underneath her breast. Did not have any lower abdominal pain. Per protocol, pt needs to be seen in office. Offered apt and scheduled.

## 2024-03-15 ENCOUNTER — ROUTINE PRENATAL (OUTPATIENT)
Dept: OBGYN CLINIC | Facility: CLINIC | Age: 34
End: 2024-03-15
Payer: COMMERCIAL

## 2024-03-15 VITALS
DIASTOLIC BLOOD PRESSURE: 71 MMHG | SYSTOLIC BLOOD PRESSURE: 103 MMHG | WEIGHT: 267 LBS | BODY MASS INDEX: 43 KG/M2 | HEART RATE: 88 BPM

## 2024-03-15 DIAGNOSIS — Z34.82 ENCOUNTER FOR SUPERVISION OF OTHER NORMAL PREGNANCY IN SECOND TRIMESTER (HCC): Primary | ICD-10-CM

## 2024-03-15 LAB
BILIRUBIN: NEGATIVE
GLUCOSE (URINE DIPSTICK): NEGATIVE MG/DL
KETONES (URINE DIPSTICK): NEGATIVE MG/DL
MULTISTIX LOT#: ABNORMAL NUMERIC
NITRITE, URINE: NEGATIVE
OCCULT BLOOD: NEGATIVE
PH, URINE: 7 (ref 4.5–8)
PROTEIN (URINE DIPSTICK): NEGATIVE MG/DL
SPECIFIC GRAVITY: 1.01 (ref 1–1.03)
URINE-COLOR: YELLOW
UROBILINOGEN,SEMI-QN: 0.2 MG/DL (ref 0–1.9)

## 2024-03-15 PROCEDURE — 81002 URINALYSIS NONAUTO W/O SCOPE: CPT | Performed by: OBSTETRICS & GYNECOLOGY

## 2024-03-29 ENCOUNTER — HOSPITAL ENCOUNTER (OUTPATIENT)
Dept: PERINATAL CARE | Facility: HOSPITAL | Age: 34
Discharge: HOME | End: 2024-03-29
Attending: OBSTETRICS & GYNECOLOGY
Payer: COMMERCIAL

## 2024-03-29 VITALS
BODY MASS INDEX: 43 KG/M2 | WEIGHT: 266 LBS | DIASTOLIC BLOOD PRESSURE: 82 MMHG | HEART RATE: 105 BPM | SYSTOLIC BLOOD PRESSURE: 132 MMHG

## 2024-03-29 DIAGNOSIS — O35.9XX0 FETAL ABNORMALITY AFFECTING MANAGEMENT OF MOTHER, SINGLE OR UNSPECIFIED FETUS (HCC): ICD-10-CM

## 2024-03-29 DIAGNOSIS — E66.01 MATERNAL MORBID OBESITY IN THIRD TRIMESTER, ANTEPARTUM (HCC): Primary | ICD-10-CM

## 2024-03-29 DIAGNOSIS — O99.213 OBESITY AFFECTING PREGNANCY IN THIRD TRIMESTER, UNSPECIFIED OBESITY TYPE (HCC): ICD-10-CM

## 2024-03-29 DIAGNOSIS — O99.213 MATERNAL MORBID OBESITY IN THIRD TRIMESTER, ANTEPARTUM (HCC): Primary | ICD-10-CM

## 2024-03-29 PROCEDURE — 76816 OB US FOLLOW-UP PER FETUS: CPT | Performed by: OBSTETRICS & GYNECOLOGY

## 2024-03-29 NOTE — PROGRESS NOTES
Outpatient Maternal-Fetal Medicine Consultation    Dear Dr. Shane    Thank you for requesting ultrasound evaluation and maternal fetal medicine consultation on your patient Kasandra Rodrigues.  As you are aware she is a 33 year old female  with a díaz pregnancy and an Estimated Date of Delivery: 24.  A maternal-fetal medicine f/u is today.  Her prenatal records and labs were reviewed.    From today.  She is comfortable with the diagnosis of a single kidney and her baby girl.    HISTORY  OB History    Para Term  AB Living   3 0 0 0 2     SAB IAB Ectopic Multiple Live Births   1 0 1          # Outcome Date GA Lbr Allan/2nd Weight Sex Delivery Anes PTL Lv   3 Current            2 SAB 2020 7w0d    SAB   FD      Birth Comments: Passed the tissue on own. D&C.    1 Ectopic               Birth Comments: Left tube. Methotrexate.       Obstetric Comments   2021 Antiphospholipid Ab testing NEGATIVE.        Allergies:  No Known Allergies   Current Meds:  Current Outpatient Medications   Medication Sig Dispense Refill    Misc. Devices (BREAST PUMP) Does not apply Misc Double electric breast pump (Patient not taking: Reported on 3/5/2024) 1 each 0    Blpuxc-AoNdl-JtKcr-Meth-FA-DHA (PRENATE MINI) 18-0.6-0.4-350 MG Oral Cap Take 1 capsule by mouth daily.      ergocalciferol 1.25 MG (66794 UT) Oral Cap Take 1 capsule (50,000 Units total) by mouth once a week.          HISTORY:  Past Medical History:   Diagnosis Date    Acne     Anxiety     Depression     Depression     Depression prior to infertility issues. Had SI but did not act on it. Got help.     Dyslipidemia 2021    Elevated LFTs 2021    Grief reaction     after miscarriage & ectopic.     History of ectopic pregnancy     Human papilloma virus (HPV) type 9 vaccine administered     Miscarriage ([facility]) 2021 Antiphospholipid Ab testing NEGATIVE.     Morbid obesity with BMI of 45.0-49.9, adult ([facility]) 2021     Pap smear for cervical cancer screening 06/19/2021    Pap & HPV negative.     Pneumonia due to COVID-19 virus 04/2020    Prediabetes 07/07/2021    A1c 5.7% (7/7/21)     Wears glasses       No past surgical history on file.   Family History   Problem Relation Age of Onset    Diabetes Father     Hypertension Father     Other (bipolar) Father     Other (depression/anxiety) Father     No Known Problems Mother     Other (lupus) Maternal Grandfather     Diabetes Paternal Grandmother     Hypertension Paternal Grandmother     Prostate Cancer Paternal Grandfather 70    Prostate Cancer Maternal Uncle 45    Thyroid disease Sister     Breast Cancer Neg     Ovarian Cancer Neg     Colon Cancer Neg     Infertility Neg     Endometriosis Neg     Birth Defects Neg     Genetic Disease Neg     Blood Clotting Disorder Neg     DVT/VTE Neg     Bipolar Disorder Father     Anxiety Father     Cancer Paternal Grandfather     Cancer Maternal Uncle     Depression Paternal Aunt       Social History     Socioeconomic History    Marital status:    Tobacco Use    Smoking status: Never    Smokeless tobacco: Never   Vaping Use    Vaping Use: Never used   Substance and Sexual Activity    Alcohol use: Yes     Comment: socially    Drug use: Never    Sexual activity: Yes     Partners: Male     Comment: Trying to conceive   Social History Narrative    ** Merged History Encounter **          Social Determinants of Health     Financial Resource Strain: Low Risk  (2/28/2024)    Financial Resource Strain     Difficulty of Paying Living Expenses: Not very hard     Med Affordability: No   Food Insecurity: No Food Insecurity (2/28/2024)    Food Insecurity     Food Insecurity: Never true   Transportation Needs: No Transportation Needs (2/28/2024)    Transportation Needs     Lack of Transportation: No   Stress: No Stress Concern Present (2/28/2024)    Stress     Feeling of Stress : No   Housing Stability: Low Risk  (2/28/2024)    Housing Stability      Housing Instability: No          PHYSICAL EXAMINATION:  /82   Pulse 105   Wt 266 lb (120.7 kg)   LMP 09/08/2023 (Exact Date)   BMI 42.93 kg/m²   Physical Exam  Constitutional:       Appearance: Normal appearance.   Abdominal:      Palpations: Abdomen is soft.      Tenderness: There is no abdominal tenderness.   Neurological:      Mental Status: She is alert.   Psychiatric:         Mood and Affect: Mood normal.         Behavior: Behavior normal.           OBSTETRIC ULTRASOUND  The patient had a follow-up growth ultrasound today which revealed normal interval fetal growth.  Ultrasound Findings:  Single IUP in breech presentation.    Placenta is anterior, fundal.   A 3 vessel cord is noted.  Cardiac activity is present at 143 bpm  EFW 1298 g ( 2 lb 14 oz); 50%.    MVP is 5.2 cm . ELI 17.8 cm    Right renal agenesis again noted.  Lying down adrenal seen in right renal fossa.    The fetal measurements are consistent with established EDC.  The patient understands that ultrasound cannot rule out all structural and chromosomal abnormalities.   See PACS/Imaging Tab For Complete Ultrasound Report  I interpreted the results and reviewed them with the patient.    DISCUSSION  During her visit we discussed and reviewed the following issues:  ADVANCED MATERNAL AGE    Background  I reviewed with the patient that pregnancies in women of advanced maternal age (35 or older at delivery) are associated with elevated risks. Specifically, there is a higher rate of:  Fetal malformations  Preeclampsia  Gestational diabetes  Intrauterine fetal death   Please see previous [facility] detailed discussion.     OBESITY:  Her BMI prior to pregnancy was 41  Please see previous [facility] detailed discussion.           GLUCOSE 1HR OB   Date Value Ref Range Status   01/20/2024 99 See comment mg/dL Final     Comment:     If the plasma glucose level measured after 1 hour is >=130, 135 or 140 mg/dl proceed to \"Glucose Tolerance, 100 gm (0 hr, 1 hr, 2hr,  3hr), Gestational (ADA)\" test on a separate day, as clinically indicated.           Prevention of Preeclampsia   Please see previous [facility] detailed discussion.     Right renal agenesis is suspected  Some cases of unilateral renal agenesis result from in utero regression of multicystic dysplastic kidneys. These patients have an ipsilateral blind ending ureter. This is an infrequent observation and thus does not explain the etiology of all cases of unilateral renal agenesis, which is likely multifactorial.  The prognosis for patients with one normal kidney is excellent, with a survival rate similar to that of age and sex-matched controls from [place] life tables.  However, proteinuria, hypertension, and renal insufficiency appear to be more common later in life.   Most pediatric urologists recommend that children with a solitary kidney avoid contact sports, although the risk of kidney loss resulting from trauma is less than 1 percent.   Unilateral renal agenesis is associated with a high incidence of nonrenal and urinary tract anomalies including cardiac.  There has been an association with karyotypic abnormalities but some studies have not shown an increase with unilateral agenesis.    Signs and symptoms of preeclampsia were reviewed.   We discussed the recommended plan of care based on her  risk factors. Kasandra had her questions answered to her satisfaction.  IMPRESSION:  IUP at 29w0d   AMA low risk NIPT, declines invasive testing  Class 3 obesity   history of depression  Prediabetes  Fetal Right renal agenesis      RECOMMENDATIONS:  Continue care with Dr. Shane  Monthly growth ultrasounds starting at 28 weeks, add BPP to each growth ultrasound at 32 weeks and beyond  Weekly NSTs at 34 weeks  Limit weight gain to 11-20 pounds.  Delivery at 39-40 weeks   aspirin 120 mg daily until 37 weeks  Complete third trimester labs        Thank you for allowing me to participate in the care of your patient.   Please do not hesitate to contact me if additional questions or concerns arise.      Yolis Estevez M.D.    20 minutes spent in review of records, patient consultation, documentation and coordination of care.  The relevant clinical matter(s) are summarized above.     Note to patient and family  The 21st Century Cures Act makes medical notes available to patients in the interest of transparency.  However, please be advised that this is a medical document.  It is intended as qcvz-cd-poxs communication.  It is written and medical language may contain abbreviations or verbiage that are technical and unfamiliar.  It may appear blunt or direct.  Medical documents are intended to carry relevant information, facts as evident, and the clinical opinion of the practitioner.

## 2024-04-01 ENCOUNTER — TELEPHONE (OUTPATIENT)
Dept: PERINATAL CARE | Facility: HOSPITAL | Age: 34
End: 2024-04-01

## 2024-04-02 ENCOUNTER — LAB ENCOUNTER (OUTPATIENT)
Dept: LAB | Age: 34
End: 2024-04-02
Attending: OBSTETRICS & GYNECOLOGY
Payer: COMMERCIAL

## 2024-04-02 LAB
BASOPHILS # BLD AUTO: 0.02 X10(3) UL (ref 0–0.2)
BASOPHILS NFR BLD AUTO: 0.2 %
DEPRECATED HBV CORE AB SER IA-ACNC: 9.5 NG/ML
EOSINOPHIL # BLD AUTO: 0.05 X10(3) UL (ref 0–0.7)
EOSINOPHIL NFR BLD AUTO: 0.5 %
ERYTHROCYTE [DISTWIDTH] IN BLOOD BY AUTOMATED COUNT: 14.9 %
GLUCOSE 1H P GLC SERPL-MCNC: 140 MG/DL
HCT VFR BLD AUTO: 34.4 %
HGB BLD-MCNC: 11.3 G/DL
IMM GRANULOCYTES # BLD AUTO: 0.04 X10(3) UL (ref 0–1)
IMM GRANULOCYTES NFR BLD: 0.4 %
LYMPHOCYTES # BLD AUTO: 1.68 X10(3) UL (ref 1–4)
LYMPHOCYTES NFR BLD AUTO: 15.5 %
MCH RBC QN AUTO: 24.8 PG (ref 26–34)
MCHC RBC AUTO-ENTMCNC: 32.8 G/DL (ref 31–37)
MCV RBC AUTO: 75.6 FL
MONOCYTES # BLD AUTO: 0.3 X10(3) UL (ref 0.1–1)
MONOCYTES NFR BLD AUTO: 2.8 %
NEUTROPHILS # BLD AUTO: 8.73 X10 (3) UL (ref 1.5–7.7)
NEUTROPHILS # BLD AUTO: 8.73 X10(3) UL (ref 1.5–7.7)
NEUTROPHILS NFR BLD AUTO: 80.6 %
PLATELET # BLD AUTO: 270 10(3)UL (ref 150–450)
RBC # BLD AUTO: 4.55 X10(6)UL
T PALLIDUM AB SER QL IA: NONREACTIVE
WBC # BLD AUTO: 10.8 X10(3) UL (ref 4–11)

## 2024-04-02 PROCEDURE — 82728 ASSAY OF FERRITIN: CPT | Performed by: OBSTETRICS & GYNECOLOGY

## 2024-04-02 PROCEDURE — 87389 HIV-1 AG W/HIV-1&-2 AB AG IA: CPT | Performed by: OBSTETRICS & GYNECOLOGY

## 2024-04-02 PROCEDURE — 86780 TREPONEMA PALLIDUM: CPT | Performed by: OBSTETRICS & GYNECOLOGY

## 2024-04-02 PROCEDURE — 85025 COMPLETE CBC W/AUTO DIFF WBC: CPT | Performed by: OBSTETRICS & GYNECOLOGY

## 2024-04-02 PROCEDURE — 82950 GLUCOSE TEST: CPT | Performed by: OBSTETRICS & GYNECOLOGY

## 2024-04-02 PROCEDURE — 36415 COLL VENOUS BLD VENIPUNCTURE: CPT | Performed by: OBSTETRICS & GYNECOLOGY

## 2024-04-10 ENCOUNTER — ROUTINE PRENATAL (OUTPATIENT)
Dept: OBGYN CLINIC | Facility: CLINIC | Age: 34
End: 2024-04-10
Payer: COMMERCIAL

## 2024-04-10 VITALS — BODY MASS INDEX: 43 KG/M2 | DIASTOLIC BLOOD PRESSURE: 74 MMHG | WEIGHT: 268 LBS | SYSTOLIC BLOOD PRESSURE: 124 MMHG

## 2024-04-10 DIAGNOSIS — Z34.83 ENCOUNTER FOR SUPERVISION OF OTHER NORMAL PREGNANCY IN THIRD TRIMESTER (HCC): Primary | ICD-10-CM

## 2024-04-10 PROCEDURE — 81003 URINALYSIS AUTO W/O SCOPE: CPT | Performed by: OBSTETRICS & GYNECOLOGY

## 2024-04-12 ENCOUNTER — LABORATORY ENCOUNTER (OUTPATIENT)
Dept: LAB | Age: 34
End: 2024-04-12
Attending: OBSTETRICS & GYNECOLOGY
Payer: COMMERCIAL

## 2024-04-12 DIAGNOSIS — Z34.82 ENCOUNTER FOR SUPERVISION OF OTHER NORMAL PREGNANCY IN SECOND TRIMESTER (HCC): ICD-10-CM

## 2024-04-12 DIAGNOSIS — R73.09 ELEVATED GLUCOSE TOLERANCE TEST: ICD-10-CM

## 2024-04-12 LAB
GLUCOSE 1H P GLC SERPL-MCNC: 122 MG/DL
GLUCOSE 2H P GLC SERPL-MCNC: 126 MG/DL
GLUCOSE 3H P GLC SERPL-MCNC: 102 MG/DL (ref 70–140)
GLUCOSE P FAST SERPL-MCNC: 92 MG/DL

## 2024-04-12 PROCEDURE — 36415 COLL VENOUS BLD VENIPUNCTURE: CPT

## 2024-04-12 PROCEDURE — 82105 ALPHA-FETOPROTEIN SERUM: CPT

## 2024-04-12 PROCEDURE — 82951 GLUCOSE TOLERANCE TEST (GTT): CPT

## 2024-04-12 PROCEDURE — 82952 GTT-ADDED SAMPLES: CPT

## 2024-04-12 NOTE — PROGRESS NOTES
Outpatient Maternal-Fetal Medicine Follow-Up    Dear Dr. Shane    Thank you for requesting an ultrasound and maternal-fetal medicine consultation on your patient Kasandra Rodrigues.  As you are aware she is a 33 year old female  with a díaz pregnancy and an Estimated Date of Delivery: 24.  She returned to maternal-fetal medicine today for a follow-up visit.  Her history was reviewed from her prior visit and there were no interval changes.    Antepartum Risk Factors  AMA low risk NIPT, declines invasive testing  Class 3 obesity   history of depression  Prediabetes  Fetal Right renal agenesis    PHYSICAL EXAMINATION:  /72   Pulse 84   Wt 268 lb (121.6 kg)   LMP 2023 (Exact Date)   BMI 43.26 kg/m²   General: alert and oriented, no acute distress  Abdomen: gravid, soft, non-tender  Extremities: non-tender, no edema    OBSTETRIC ULTRASOUND  The patient had a follow-up growth ultrasound today which revealed normal interval fetal growth, BPP 8/8.    Ultrasound Findings:  Single IUP in breech presentation.    Placenta is anterior, fundal.   A 3 vessel cord is noted.  Cardiac activity is present at 133 bpm  EFW 2343 g ( 5 lb 3 oz); 63%.    ELI is  17.2 cm.  MVP is 6 cm  BPP is 8/8.     Right renal agenesis is again visualized.    The fetal measurements are consistent with established EDC. No gross ultrasound evidence of structural abnormalities are seen today. The patient understands that ultrasound cannot rule out all structural and chromosomal abnormalities.     See Imaging Tab For Complete Ultrasound Report  I interpreted the results and reviewed them with the patient.    DISCUSSION  During her visit we discussed and reviewed the following issues:  ADVANCED MATERNAL AGE  See prior [facility] notes for a detailed review.  She did not desire invasive genetic testing.   She has already obtained a low-risk NPIT result and was appropriately reassured.      OBESITY:  Her BMI prior to pregnancy was  41    Please see previous [facility] detailed discussion.      UNILATERAL (RIGHT) RENAL AGENESIS  Some cases of unilateral renal agenesis result from in utero regression of multicystic dysplastic kidneys. These patients have an ipsilateral blind ending ureter. This is an infrequent observation and thus does not explain the etiology of all cases of unilateral renal agenesis, which is likely multifactorial.  The prognosis for patients with one normal kidney is excellent, with a survival rate similar to that of age and sex-matched controls from [place] life tables.  However, proteinuria, hypertension, and renal insufficiency appear to be more common later in life.   Most pediatric urologists recommend that children with a solitary kidney avoid contact sports, although the risk of kidney loss resulting from trauma is less than 1 percent.   Unilateral renal agenesis is associated with a high incidence of nonrenal and urinary tract anomalies including cardiac.  There has been an association with karyotypic abnormalities but some studies have not shown an increase with unilateral agenesis.     IMPRESSION:  IUP at 32w0d   AMA low risk NIPT, declines invasive testing  Class 3 obesity   history of depression  Prediabetes  Fetal Right renal agenesis     RECOMMENDATIONS:  Continue care with Dr. Shane  Monthly growth ultrasounds starting at 28 weeks, add BPP to each growth ultrasound at 32 weeks and beyond  Weekly NSTs at 34 weeks  Limit weight gain to 11-20 pounds.  Delivery at 39-40 weeks   aspirin 120 mg daily until 37 weeks        Thank you for allowing me to participate in the care of your patient.  Please do not hesitate to contact me if additional questions or concerns arise.       Noe Stratton M.D.  Maternal-Fetal Medicine    30 minutes spent in review of records, patient consultation, documentation and coordination of care.  The relevant clinical matter(s) are summarized above.      Note to patient and family  The  21st Century Cures Act makes medical notes available to patients in the interest of transparency.  However, please be advised that this is a medical document.  It is intended as ywbh-io-dhpc communication.  It is written and medical language may contain abbreviations or verbiage that are technical and unfamiliar.  It may appear blunt or direct.  Medical documents are intended to carry relevant information, facts as evident, and the clinical opinion of the practitioner.

## 2024-04-16 LAB
AFP MOM: 1.99
AFP VALUE: 125.7 NG/ML
GA ON COLL DATE: 23 WEEKS
INSULIN DEP AFP: NO
MAT AGE AT EDD: 36 YR
MULTIPLE GEST AFP: NO
OSBR RISK 1 IN AFP: 824
WEIGHT AFP: 263 LBS

## 2024-04-26 ENCOUNTER — HOSPITAL ENCOUNTER (OUTPATIENT)
Dept: PERINATAL CARE | Facility: HOSPITAL | Age: 34
Discharge: HOME | End: 2024-04-26
Attending: OBSTETRICS & GYNECOLOGY
Payer: COMMERCIAL

## 2024-04-26 VITALS
WEIGHT: 268 LBS | SYSTOLIC BLOOD PRESSURE: 115 MMHG | DIASTOLIC BLOOD PRESSURE: 72 MMHG | BODY MASS INDEX: 43 KG/M2 | HEART RATE: 84 BPM

## 2024-04-26 DIAGNOSIS — E66.01 MORBID OBESITY WITH BMI OF 45.0-49.9, ADULT (HCC): Primary | ICD-10-CM

## 2024-04-26 DIAGNOSIS — O09.523 AMA (ADVANCED MATERNAL AGE) MULTIGRAVIDA 35+, THIRD TRIMESTER (HCC): ICD-10-CM

## 2024-04-26 DIAGNOSIS — O35.EXX0: ICD-10-CM

## 2024-04-26 DIAGNOSIS — E66.01 MORBID OBESITY WITH BMI OF 45.0-49.9, ADULT (HCC): ICD-10-CM

## 2024-04-26 PROCEDURE — 76819 FETAL BIOPHYS PROFIL W/O NST: CPT

## 2024-04-26 PROCEDURE — 76816 OB US FOLLOW-UP PER FETUS: CPT | Performed by: OBSTETRICS & GYNECOLOGY

## 2024-04-29 ENCOUNTER — ROUTINE PRENATAL (OUTPATIENT)
Dept: OBGYN CLINIC | Facility: CLINIC | Age: 34
End: 2024-04-29
Payer: COMMERCIAL

## 2024-04-29 VITALS — DIASTOLIC BLOOD PRESSURE: 81 MMHG | SYSTOLIC BLOOD PRESSURE: 117 MMHG | BODY MASS INDEX: 44 KG/M2 | WEIGHT: 269.63 LBS

## 2024-04-29 DIAGNOSIS — Z34.83 ENCOUNTER FOR SUPERVISION OF OTHER NORMAL PREGNANCY IN THIRD TRIMESTER (HCC): Primary | ICD-10-CM

## 2024-04-29 LAB
APPEARANCE: CLEAR
BILIRUBIN: NEGATIVE
GLUCOSE (URINE DIPSTICK): NEGATIVE MG/DL
KETONES (URINE DIPSTICK): NEGATIVE MG/DL
LEUKOCYTES: NEGATIVE
MULTISTIX LOT#: NORMAL NUMERIC
NITRITE, URINE: NEGATIVE
OCCULT BLOOD: NEGATIVE
PH, URINE: 5.5 (ref 4.5–8)
PROTEIN (URINE DIPSTICK): NEGATIVE MG/DL
SPECIFIC GRAVITY: 1.01 (ref 1–1.03)
URINE-COLOR: YELLOW
UROBILINOGEN,SEMI-QN: 0.2 MG/DL (ref 0–1.9)

## 2024-04-29 RX ORDER — MAGNESIUM OXIDE 400 MG (241.3 MG MAGNESIUM) TABLET
325 TABLET
COMMUNITY

## 2024-04-29 RX ORDER — ASPIRIN 81 MG/1
TABLET, CHEWABLE ORAL DAILY
COMMUNITY

## 2024-05-03 ENCOUNTER — HOSPITAL ENCOUNTER (OUTPATIENT)
Dept: PERINATAL CARE | Facility: HOSPITAL | Age: 34
Discharge: HOME | End: 2024-05-03
Attending: OBSTETRICS & GYNECOLOGY
Payer: COMMERCIAL

## 2024-05-03 DIAGNOSIS — E11.9 DIABETES MELLITUS (HCC): ICD-10-CM

## 2024-05-03 DIAGNOSIS — O09.523 AMA (ADVANCED MATERNAL AGE) MULTIGRAVIDA 35+, THIRD TRIMESTER (HCC): Primary | ICD-10-CM

## 2024-05-03 PROCEDURE — 59025 FETAL NON-STRESS TEST: CPT

## 2024-05-09 ENCOUNTER — TELEPHONE (OUTPATIENT)
Dept: OBGYN CLINIC | Facility: CLINIC | Age: 34
End: 2024-05-09

## 2024-05-09 NOTE — TELEPHONE ENCOUNTER
Patient is 34w6d and states she had a sharp pain on her right side, pain was so strong she was unable to walk, states pain has subsided but looking for guidance. Please advise

## 2024-05-09 NOTE — TELEPHONE ENCOUNTER
Patient verified name and date of birth.     Patient experienced sharp pain on right side of abdomen, states pain was present after walking around the store. Patient was having difficulty walking and pain was relieved upon rest. Patient states pain has now diminished and has not returned. Patient has appointment tomorrow with Dr. Shane. Discussed with patient to continue monitoring symptoms and follow up at appointment tomorrow. Labor precautions reviewed. Patient in agreement with plan.

## 2024-05-10 ENCOUNTER — ROUTINE PRENATAL (OUTPATIENT)
Dept: OBGYN CLINIC | Facility: CLINIC | Age: 34
End: 2024-05-10
Payer: COMMERCIAL

## 2024-05-10 ENCOUNTER — HOSPITAL ENCOUNTER (OUTPATIENT)
Dept: PERINATAL CARE | Facility: HOSPITAL | Age: 34
Discharge: HOME | End: 2024-05-10
Attending: OBSTETRICS & GYNECOLOGY
Payer: COMMERCIAL

## 2024-05-10 VITALS — WEIGHT: 272 LBS | SYSTOLIC BLOOD PRESSURE: 128 MMHG | BODY MASS INDEX: 44 KG/M2 | DIASTOLIC BLOOD PRESSURE: 80 MMHG

## 2024-05-10 DIAGNOSIS — O09.523 AMA (ADVANCED MATERNAL AGE) MULTIGRAVIDA 35+, THIRD TRIMESTER (HCC): ICD-10-CM

## 2024-05-10 DIAGNOSIS — E66.01 MORBID OBESITY (HCC): ICD-10-CM

## 2024-05-10 DIAGNOSIS — Z34.83 ENCOUNTER FOR SUPERVISION OF OTHER NORMAL PREGNANCY IN THIRD TRIMESTER (HCC): Primary | ICD-10-CM

## 2024-05-10 DIAGNOSIS — E11.9 DIABETES MELLITUS (HCC): Primary | ICD-10-CM

## 2024-05-10 PROCEDURE — 59025 FETAL NON-STRESS TEST: CPT

## 2024-05-10 PROCEDURE — 81002 URINALYSIS NONAUTO W/O SCOPE: CPT | Performed by: OBSTETRICS & GYNECOLOGY

## 2024-05-10 RX ORDER — VALACYCLOVIR HYDROCHLORIDE 500 MG/1
500 TABLET, FILM COATED ORAL DAILY
Qty: 30 TABLET | Refills: 0 | Status: SHIPPED | OUTPATIENT
Start: 2024-05-10

## 2024-05-10 NOTE — PROGRESS NOTES
Outpatient Maternal-Fetal Medicine Follow-Up     Dear Dr. Shane     Thank you for requesting an ultrasound and maternal-fetal medicine consultation on your patient Kasandra Rodrigues.  As you are aware she is a 33 year old female  with a díaz pregnancy and an Estimated Date of Delivery: 24.  She returned to maternal-fetal medicine today for a follow-up visit.  Her history was reviewed from her prior visit and there were no interval changes.     Antepartum Risk Factors  AMA low risk NIPT, declines invasive testing  Class 3 obesity   history of depression  Prediabetes  Fetal Right renal agenesis     PHYSICAL EXAMINATION:  /83   Pulse 88   Wt 273 lb (123.8 kg)   LMP 2023 (Exact Date)   BMI 44.06 kg/m²   General: alert and oriented, no acute distress  Abdomen: gravid, soft, non-tender  Extremities: non-tender, no edema     OBSTETRIC ULTRASOUND  The patient had a follow-up growth ultrasound today which revealed normal interval fetal growth, BPP 8/8.     Ultrasound Findings:  Single IUP in cephalic presentation.    Placenta is anterior, fundal.   A 3 vessel cord is noted.  Cardiac activity is present at 142 bpm  EFW 3044 g ( 6 lb 11 oz); 49%.    ELI is  11.8 cm.  MVP is 4.4 cm  BPP is 8/8.     Right kidney: abnormal, renal agenesis        See Imaging Tab For Complete Ultrasound Report  I interpreted the results and reviewed them with the patient.     DISCUSSION  During her visit we discussed and reviewed the following issues:  ADVANCED MATERNAL AGE  See prior [facility] notes for a detailed review.  She did not desire invasive genetic testing.   She has already obtained a low-risk NPIT result and was appropriately reassured.      OBESITY:  Her BMI prior to pregnancy was 41    Please see previous [facility] detailed discussion.      UNILATERAL (RIGHT) RENAL AGENESIS  Some cases of unilateral renal agenesis result from in utero regression of multicystic dysplastic kidneys. These patients have an  ipsilateral blind ending ureter. This is an infrequent observation and thus does not explain the etiology of all cases of unilateral renal agenesis, which is likely multifactorial.  The prognosis for patients with one normal kidney is excellent, with a survival rate similar to that of age and sex-matched controls from [place] life tables.  However, proteinuria, hypertension, and renal insufficiency appear to be more common later in life.   Most pediatric urologists recommend that children with a solitary kidney avoid contact sports, although the risk of kidney loss resulting from trauma is less than 1 percent.   Unilateral renal agenesis is associated with a high incidence of nonrenal and urinary tract anomalies including cardiac.  There has been an association with karyotypic abnormalities but some studies have not shown an increase with unilateral agenesis.      IMPRESSION:  IUP at 37w0d   AMA low risk NIPT, declines invasive testing  Class 3 obesity   history of depression  Prediabetes  Fetal Right renal agenesis     RECOMMENDATIONS:  Continue care with Dr. Shane  Monthly growth ultrasounds starting at 28 weeks, add BPP to each growth ultrasound at 32 weeks and beyond  Weekly NSTs at 34 weeks  Limit weight gain to 11-20 pounds.  Delivery at 39-40 weeks   aspirin 120 mg daily until 37 weeks     Thank you for allowing me to participate in the care of your patient.  Please do not hesitate to contact me if additional questions or concerns arise.       Noe Stratton M.D.  Maternal-Fetal Medicine     30 minutes spent in review of records, patient consultation, documentation and coordination of care.  The relevant clinical matter(s) are summarized above.      Note to patient and family  The 21st Century Cures Act makes medical notes available to patients in the interest of transparency.  However, please be advised that this is a medical document.  It is intended as trup-ai-fctq communication.  It is written  and medical language may contain abbreviations or verbiage that are technical and unfamiliar.  It may appear blunt or direct.  Medical documents are intended to carry relevant information, facts as evident, and the clinical opinion of the practitioner.

## 2024-05-10 NOTE — PROGRESS NOTES
Pt stated her spouse tested pos for hsv, pt has remained asx, pt did speak with her other doctor, pt has been considering and pt wants to start valtrex 500 mg po daily now. Rx sent.

## 2024-05-17 ENCOUNTER — HOSPITAL ENCOUNTER (OUTPATIENT)
Dept: PERINATAL CARE | Facility: HOSPITAL | Age: 34
Discharge: HOME | End: 2024-05-17
Attending: OBSTETRICS & GYNECOLOGY

## 2024-05-17 ENCOUNTER — ROUTINE PRENATAL (OUTPATIENT)
Dept: OBGYN CLINIC | Facility: CLINIC | Age: 34
End: 2024-05-17

## 2024-05-17 VITALS — DIASTOLIC BLOOD PRESSURE: 77 MMHG | SYSTOLIC BLOOD PRESSURE: 116 MMHG | BODY MASS INDEX: 44 KG/M2 | WEIGHT: 273 LBS

## 2024-05-17 DIAGNOSIS — Z34.83 ENCOUNTER FOR SUPERVISION OF OTHER NORMAL PREGNANCY IN THIRD TRIMESTER (HCC): Primary | ICD-10-CM

## 2024-05-17 DIAGNOSIS — E66.01 MORBID OBESITY WITH BMI OF 45.0-49.9, ADULT (HCC): ICD-10-CM

## 2024-05-17 DIAGNOSIS — E11.9 DIABETES MELLITUS (HCC): ICD-10-CM

## 2024-05-17 DIAGNOSIS — O09.523 AMA (ADVANCED MATERNAL AGE) MULTIGRAVIDA 35+, THIRD TRIMESTER (HCC): Primary | ICD-10-CM

## 2024-05-17 LAB
BILIRUBIN: NEGATIVE
GLUCOSE (URINE DIPSTICK): NEGATIVE MG/DL
KETONES (URINE DIPSTICK): NEGATIVE MG/DL
MULTISTIX LOT#: ABNORMAL NUMERIC
NITRITE, URINE: NEGATIVE
OCCULT BLOOD: NEGATIVE
PH, URINE: 6 (ref 4.5–8)
PROTEIN (URINE DIPSTICK): NEGATIVE MG/DL
SPECIFIC GRAVITY: 1.02 (ref 1–1.03)
URINE-COLOR: YELLOW
UROBILINOGEN,SEMI-QN: 0.2 MG/DL (ref 0–1.9)

## 2024-05-17 PROCEDURE — 59025 FETAL NON-STRESS TEST: CPT | Performed by: OBSTETRICS & GYNECOLOGY

## 2024-05-17 PROCEDURE — 81002 URINALYSIS NONAUTO W/O SCOPE: CPT | Performed by: OBSTETRICS & GYNECOLOGY

## 2024-05-17 PROCEDURE — 59025 FETAL NON-STRESS TEST: CPT

## 2024-05-18 ENCOUNTER — NST DOCUMENTATION (OUTPATIENT)
Dept: OBGYN CLINIC | Facility: CLINIC | Age: 34
End: 2024-05-18

## 2024-05-18 DIAGNOSIS — E66.01 MORBID OBESITY WITH BMI OF 45.0-49.9, ADULT (HCC): Primary | ICD-10-CM

## 2024-05-18 LAB — GROUP B STREP BY PCR FOR PCR OVT: NEGATIVE

## 2024-05-18 NOTE — NST
Nonstress Test   Patient: Kasandra Rodrigues    Gestation: 36w1d    Diagnosis from order:      Problem List:   Patient Active Problem List   Diagnosis    Major depressive disorder, single episode, severe without psychosis (HCC)    Morbid obesity ([facility])    Elevated alkaline phosphatase level    Anxiety disorder, unspecified    Social anxiety disorder    Menorrhagia with regular cycle    Anxiety    Depression    Grief reaction    History of miscarriage    Infertility, female, secondary    Morbid obesity with BMI of 45.0-49.9, adult ([facility])    Acanthosis nigricans    Right ovarian cyst    Prediabetes    Elevated LFTs    Dyslipidemia    Hypercholesterolemia    Ectopic pregnancy (HCC)    Diabetes mellitus ([facility])    AMA (advanced maternal age) multigravida 35+, third trimester ([facility]) [O09.523]    Renal agenesis of fetus affecting antepartum care of mother ([facility])       NST:        2024   NST DOCUMENTATION   Variability 6-25 BPM    6-25 BPM   Accelerations Yes    Yes   Decelerations None    None   Baseline 130 BPM    130 BPM   Uterine Irritability No   Contractions Not present   Acoustic Stimulator No    No   Nonstress Test Interpretation Reactive   Nonstress Test Second Interpretation Reactive   NST Completed by Marshall STILES   Disposition Home    Testing Plan Weekly NST   Provider Notified Carlin HARRIS       Multiple values from one day are sorted in reverse-chronological order         I agree with the above evaluation. NST completed.  Won Gillis MD  2024  10:43 AM

## 2024-05-22 ENCOUNTER — TELEPHONE (OUTPATIENT)
Dept: OBGYN CLINIC | Facility: CLINIC | Age: 34
End: 2024-05-22

## 2024-05-22 ENCOUNTER — HOSPITAL ENCOUNTER (OUTPATIENT)
Facility: HOSPITAL | Age: 34
Discharge: HOME | End: 2024-05-22
Attending: STUDENT IN AN ORGANIZED HEALTH CARE EDUCATION/TRAINING PROGRAM | Admitting: OBSTETRICS & GYNECOLOGY

## 2024-05-22 ENCOUNTER — HOSPITAL ENCOUNTER (OUTPATIENT)
Facility: HOSPITAL | Age: 34
Discharge: HOME | End: 2024-05-22
Attending: STUDENT IN AN ORGANIZED HEALTH CARE EDUCATION/TRAINING PROGRAM | Admitting: STUDENT IN AN ORGANIZED HEALTH CARE EDUCATION/TRAINING PROGRAM

## 2024-05-22 VITALS — HEART RATE: 81 BPM | SYSTOLIC BLOOD PRESSURE: 130 MMHG | DIASTOLIC BLOOD PRESSURE: 69 MMHG

## 2024-05-22 PROCEDURE — 99214 OFFICE O/P EST MOD 30 MIN: CPT

## 2024-05-22 PROCEDURE — 59025 FETAL NON-STRESS TEST: CPT

## 2024-05-22 NOTE — PROGRESS NOTES
Pt is a 33 year old female admitted to TR1/TR1-A.     Chief Complaint   Patient presents with    R/o Rom     Pt felt wetness and leaking at 1145 when she used the restroom.      Pt is  36w5d intra-uterine pregnancy.  History obtained, consents signed. Oriented to room, staff, and plan of care.

## 2024-05-22 NOTE — TRIAGE
[facility]  part of [facility]      Triage Note    Kasandra Rodrigues Patient Status:  Outpatient    1990 MRN [id number]   Location [facility] CENTER Attending Heide Jose MD   Hosp Day # 0 PCP Lb Shane MD      Para:   Estimated Date of Delivery: 24  Gestation: 36w5d    Chief Complaint    R/o Rom         Allergies:  No Known Allergies    Orders Placed This Encounter   Procedures    POCT Ferning       Lab Results   Component Value Date    WBC 10.8 2024    HGB 11.3 (L) 2024    HCT 34.4 (L) 2024    .0 2024    CREATSERUM 0.66 2021    BUN 9 2021     2021    K 4.0 2021     2021    CO2 27 2021    GLU 95 2021    CA 9.2 2021    ALB 4.2 2021    ALKPHO 94 2021    BILT 0.7 2021    TP 7.6 2021    AST 36 (H) 2021    ALT 74 (H) 2021    TSH 1.581 2023    GGT 46 2018    ETOH <3 2018       Clinitek UA  Lab Results   Component Value Date    GLUUR Normal 2023    SPECGRAVITY 1.025 2024    URINECUL 50,000-99,000 CFU/ML Escherichia coli (A) 2023       UA  Lab Results   Component Value Date    COLORUR Light-Yellow 2023    CLARITY Clear 2023    SPECGRAVITY 1.025 2024    PROUR Negative 2023    GLUUR Normal 2023    KETUR Negative 2023    BILUR Negative 2023    BLOODURINE Negative 2023    NITRITE Negative 2024    UROBILINOGEN Normal 2023    LEUUR 250 (A) 2023       Vitals:    24 1330   BP: 130/69   Pulse: 81       NST  Variability: Moderate           Accelerations: Yes           Decelerations: None            Baseline: 140 BPM           Uterine Irritability: No           Contractions: Not present                                        Acoustic Stimulator: No           Nonstress Test Interpretation: Reactive            Nonstress Test Second Interpretation: Reactive          FHR Category: Category I             Additional Comments       Chief Complaint   Patient presents with    R/o Rom     Pt felt wetness and leaking at 1145 when she used the restroom.     Pt here for c/o leaking fluid. Sterile speculum exam performed and not pooling of fluid, equivocal amniotest, and negative ferning. Small circular vesicals noted on cervix. Findings reported to Dr Jose per telephone. Pt dc home with labor precautions and kick counts. Pt educated to take valtrex as prescribe. Pt with verbalized understanding.     Eryn ALVAREZ RN  5/22/2024 1:53 PM

## 2024-05-22 NOTE — TELEPHONE ENCOUNTER
Name and Date of birth verified    Patient is 36w5d. She reports using the restroom at work and feeling of underwear being wet. Patient also notes some yellow discharge.     Patient advised to go to [facility] for assessment and voiced understanding.     FBC GO Cerna notified.

## 2024-05-24 ENCOUNTER — HOSPITAL ENCOUNTER (OUTPATIENT)
Dept: PERINATAL CARE | Facility: HOSPITAL | Age: 34
Discharge: HOME | End: 2024-05-24
Attending: OBSTETRICS & GYNECOLOGY

## 2024-05-24 VITALS
WEIGHT: 273 LBS | BODY MASS INDEX: 44 KG/M2 | DIASTOLIC BLOOD PRESSURE: 83 MMHG | SYSTOLIC BLOOD PRESSURE: 129 MMHG | HEART RATE: 88 BPM

## 2024-05-24 DIAGNOSIS — O35.EXX0: ICD-10-CM

## 2024-05-24 DIAGNOSIS — E66.01 MORBID OBESITY WITH BMI OF 45.0-49.9, ADULT (HCC): ICD-10-CM

## 2024-05-24 DIAGNOSIS — E66.01 MATERNAL MORBID OBESITY IN THIRD TRIMESTER, ANTEPARTUM (HCC): ICD-10-CM

## 2024-05-24 DIAGNOSIS — O09.523 AMA (ADVANCED MATERNAL AGE) MULTIGRAVIDA 35+, THIRD TRIMESTER (HCC): ICD-10-CM

## 2024-05-24 DIAGNOSIS — O99.213 MATERNAL MORBID OBESITY IN THIRD TRIMESTER, ANTEPARTUM (HCC): ICD-10-CM

## 2024-05-24 DIAGNOSIS — O09.523 AMA (ADVANCED MATERNAL AGE) MULTIGRAVIDA 35+, THIRD TRIMESTER (HCC): Primary | ICD-10-CM

## 2024-05-24 PROCEDURE — 76816 OB US FOLLOW-UP PER FETUS: CPT | Performed by: OBSTETRICS & GYNECOLOGY

## 2024-05-24 PROCEDURE — 76819 FETAL BIOPHYS PROFIL W/O NST: CPT

## 2024-05-31 ENCOUNTER — ROUTINE PRENATAL (OUTPATIENT)
Dept: OBGYN CLINIC | Facility: CLINIC | Age: 34
End: 2024-05-31

## 2024-05-31 ENCOUNTER — HOSPITAL ENCOUNTER (OUTPATIENT)
Dept: PERINATAL CARE | Facility: HOSPITAL | Age: 34
Discharge: HOME | End: 2024-05-31
Attending: OBSTETRICS & GYNECOLOGY

## 2024-05-31 VITALS
BODY MASS INDEX: 44 KG/M2 | DIASTOLIC BLOOD PRESSURE: 73 MMHG | SYSTOLIC BLOOD PRESSURE: 125 MMHG | HEART RATE: 68 BPM | WEIGHT: 272 LBS

## 2024-05-31 DIAGNOSIS — E66.01 MORBID OBESITY WITH BMI OF 45.0-49.9, ADULT (HCC): ICD-10-CM

## 2024-05-31 DIAGNOSIS — N89.8 VAGINAL DISCHARGE: ICD-10-CM

## 2024-05-31 DIAGNOSIS — Z34.83 ENCOUNTER FOR SUPERVISION OF OTHER NORMAL PREGNANCY IN THIRD TRIMESTER (HCC): Primary | ICD-10-CM

## 2024-05-31 DIAGNOSIS — O09.523 AMA (ADVANCED MATERNAL AGE) MULTIGRAVIDA 35+, THIRD TRIMESTER (HCC): Primary | ICD-10-CM

## 2024-05-31 LAB
BILIRUBIN: NEGATIVE
GLUCOSE (URINE DIPSTICK): NEGATIVE MG/DL
KETONES (URINE DIPSTICK): NEGATIVE MG/DL
MULTISTIX LOT#: ABNORMAL NUMERIC
NITRITE, URINE: NEGATIVE
PH, URINE: 5.5 (ref 4.5–8)
PROTEIN (URINE DIPSTICK): NEGATIVE MG/DL
SPECIFIC GRAVITY: 1.01 (ref 1–1.03)
URINE-COLOR: YELLOW
UROBILINOGEN,SEMI-QN: 0.2 MG/DL (ref 0–1.9)

## 2024-05-31 PROCEDURE — 81002 URINALYSIS NONAUTO W/O SCOPE: CPT | Performed by: OBSTETRICS & GYNECOLOGY

## 2024-05-31 PROCEDURE — 59025 FETAL NON-STRESS TEST: CPT

## 2024-05-31 NOTE — PROGRESS NOTES
Good fm. No c/o.  Per mfm, IOL at 39 weeks, pt scheduled at 39 weeks, plan cytotec followed by pitocin.

## 2024-06-01 ENCOUNTER — NST DOCUMENTATION (OUTPATIENT)
Dept: OBGYN CLINIC | Facility: CLINIC | Age: 34
End: 2024-06-01

## 2024-06-01 DIAGNOSIS — E66.01 MORBID OBESITY (HCC): Primary | ICD-10-CM

## 2024-06-01 DIAGNOSIS — O09.523 AMA (ADVANCED MATERNAL AGE) MULTIGRAVIDA 35+, THIRD TRIMESTER (HCC): ICD-10-CM

## 2024-06-01 PROCEDURE — 59025 FETAL NON-STRESS TEST: CPT | Performed by: OBSTETRICS & GYNECOLOGY

## 2024-06-01 NOTE — NST
Nonstress Test   Patient: Kasandra Rodrigues    Gestation: 38w1d    Diagnosis from order:      ICD-10-CM    1. Morbid obesity (HCC) [E66.01]  E66.01       2. AMA (advanced maternal age) multigravida 35+, third trimester (HCC) [O09.523]  O09.523             Problem List:   Patient Active Problem List   Diagnosis    Major depressive disorder, single episode, severe without psychosis (HCC)    Morbid obesity (HCC)    Elevated alkaline phosphatase level    Anxiety disorder, unspecified    Social anxiety disorder    Menorrhagia with regular cycle    Anxiety    Depression    Grief reaction    History of miscarriage    Infertility, female, secondary    Morbid obesity with BMI of 45.0-49.9, adult ([facility])    Acanthosis nigricans    Right ovarian cyst    Prediabetes    Elevated LFTs    Dyslipidemia    Hypercholesterolemia    Ectopic pregnancy (HCC)    Diabetes mellitus (HCC)    AMA (advanced maternal age) multigravida 35+, third trimester (HCC) [O09.523]    Renal agenesis of fetus affecting antepartum care of mother ([facility])       NST:        2024   NST DOCUMENTATION   Variability 6-25 BPM   Accelerations Yes   Decelerations None   Baseline 130 BPM   Uterine Irritability No   Contractions Not present   Acoustic Stimulator No   Nonstress Test Interpretation Reactive   Nonstress Test Second Interpretation Reactive   NST Completed by Marshall STILES   Disposition Home    Testing Plan Weekly NST   Provider Notified August HARRIS            I agree with the above evaluation. NST completed.  Oly Koch MD  2024  11:30 AM

## 2024-06-04 LAB
BV BACTERIA DNA VAG QL NAA+PROBE: POSITIVE
C GLABRATA DNA VAG QL NAA+PROBE: NEGATIVE
C KRUSEI DNA VAG QL NAA+PROBE: NEGATIVE
CANDIDA DNA VAG QL NAA+PROBE: NEGATIVE
T VAGINALIS DNA VAG QL NAA+PROBE: NEGATIVE

## 2024-06-05 ENCOUNTER — ORDERS FOR ADMISSION (OUTPATIENT)
Dept: OBGYN CLINIC | Facility: CLINIC | Age: 34
End: 2024-06-05

## 2024-06-05 RX ORDER — ACETAMINOPHEN 500 MG
1000 TABLET ORAL EVERY 6 HOURS PRN
Status: CANCELLED | OUTPATIENT
Start: 2024-06-05

## 2024-06-05 RX ORDER — NALBUPHINE HYDROCHLORIDE 10 MG/ML
10 INJECTION, SOLUTION INTRAMUSCULAR; INTRAVENOUS; SUBCUTANEOUS EVERY 6 HOURS PRN
Status: CANCELLED | OUTPATIENT
Start: 2024-06-05

## 2024-06-05 RX ORDER — HYDROXYZINE HYDROCHLORIDE 50 MG/ML
50 INJECTION, SOLUTION INTRAMUSCULAR EVERY 6 HOURS PRN
Status: CANCELLED | OUTPATIENT
Start: 2024-06-05

## 2024-06-05 RX ORDER — ACETAMINOPHEN 500 MG
500 TABLET ORAL EVERY 6 HOURS PRN
Status: CANCELLED | OUTPATIENT
Start: 2024-06-05

## 2024-06-05 RX ORDER — CITRIC ACID/SODIUM CITRATE 334-500MG
30 SOLUTION, ORAL ORAL AS NEEDED
Status: CANCELLED | OUTPATIENT
Start: 2024-06-05

## 2024-06-05 RX ORDER — ONDANSETRON 2 MG/ML
4 INJECTION INTRAMUSCULAR; INTRAVENOUS EVERY 6 HOURS PRN
Status: CANCELLED | OUTPATIENT
Start: 2024-06-05

## 2024-06-05 RX ORDER — DIPHENHYDRAMINE HYDROCHLORIDE 50 MG/ML
25 INJECTION INTRAMUSCULAR; INTRAVENOUS NIGHTLY PRN
Status: CANCELLED | OUTPATIENT
Start: 2024-06-05

## 2024-06-05 RX ORDER — LIDOCAINE HYDROCHLORIDE 10 MG/ML
30 INJECTION, SOLUTION EPIDURAL; INFILTRATION; INTRACAUDAL; PERINEURAL ONCE
Status: CANCELLED | OUTPATIENT
Start: 2024-06-05 | End: 2024-06-05

## 2024-06-05 RX ORDER — DEXTROSE, SODIUM CHLORIDE, SODIUM LACTATE, POTASSIUM CHLORIDE, AND CALCIUM CHLORIDE 5; .6; .31; .03; .02 G/100ML; G/100ML; G/100ML; G/100ML; G/100ML
INJECTION, SOLUTION INTRAVENOUS CONTINUOUS
Status: CANCELLED | OUTPATIENT
Start: 2024-06-05

## 2024-06-05 RX ORDER — IBUPROFEN 200 MG
600 TABLET ORAL ONCE AS NEEDED
Status: CANCELLED | OUTPATIENT
Start: 2024-06-05

## 2024-06-05 RX ORDER — SODIUM CHLORIDE, SODIUM LACTATE, POTASSIUM CHLORIDE, CALCIUM CHLORIDE 600; 310; 30; 20 MG/100ML; MG/100ML; MG/100ML; MG/100ML
INJECTION, SOLUTION INTRAVENOUS AS NEEDED
Status: CANCELLED | OUTPATIENT
Start: 2024-06-05

## 2024-06-05 RX ORDER — MISOPROSTOL 100 UG/1
25 TABLET ORAL
Status: CANCELLED | OUTPATIENT
Start: 2024-06-05 | End: 2024-06-06

## 2024-06-05 RX ORDER — TERBUTALINE SULFATE 1 MG/ML
0.25 INJECTION, SOLUTION SUBCUTANEOUS AS NEEDED
Status: CANCELLED | OUTPATIENT
Start: 2024-06-05

## 2024-06-06 ENCOUNTER — TELEPHONE (OUTPATIENT)
Dept: OBGYN UNIT | Facility: HOSPITAL | Age: 34
End: 2024-06-06

## 2024-06-06 ENCOUNTER — ROUTINE PRENATAL (OUTPATIENT)
Dept: OBGYN CLINIC | Facility: CLINIC | Age: 34
End: 2024-06-06

## 2024-06-06 VITALS — DIASTOLIC BLOOD PRESSURE: 82 MMHG | SYSTOLIC BLOOD PRESSURE: 117 MMHG | BODY MASS INDEX: 44 KG/M2 | WEIGHT: 273.81 LBS

## 2024-06-06 DIAGNOSIS — O09.523 AMA (ADVANCED MATERNAL AGE) MULTIGRAVIDA 35+, THIRD TRIMESTER (HCC): Primary | ICD-10-CM

## 2024-06-07 ENCOUNTER — HOSPITAL ENCOUNTER (INPATIENT)
Facility: HOSPITAL | Age: 34
LOS: 4 days | Discharge: HOME | End: 2024-06-11
Attending: STUDENT IN AN ORGANIZED HEALTH CARE EDUCATION/TRAINING PROGRAM | Admitting: STUDENT IN AN ORGANIZED HEALTH CARE EDUCATION/TRAINING PROGRAM
Payer: COMMERCIAL

## 2024-06-07 ENCOUNTER — TELEPHONE (OUTPATIENT)
Dept: OBGYN CLINIC | Facility: CLINIC | Age: 34
End: 2024-06-07

## 2024-06-07 ENCOUNTER — APPOINTMENT (OUTPATIENT)
Dept: OBGYN CLINIC | Facility: HOSPITAL | Age: 34
End: 2024-06-07
Attending: STUDENT IN AN ORGANIZED HEALTH CARE EDUCATION/TRAINING PROGRAM
Payer: COMMERCIAL

## 2024-06-07 PROBLEM — Z34.90 PREGNANCY (HCC): Status: ACTIVE | Noted: 2024-06-07

## 2024-06-07 LAB
ANTIBODY SCREEN: NEGATIVE
BASOPHILS # BLD AUTO: 0.02 X10(3) UL (ref 0–0.2)
BASOPHILS NFR BLD AUTO: 0.2 %
DEPRECATED RDW RBC AUTO: 43.8 FL (ref 35.1–46.3)
EOSINOPHIL # BLD AUTO: 0.07 X10(3) UL (ref 0–0.7)
EOSINOPHIL NFR BLD AUTO: 0.7 %
ERYTHROCYTE [DISTWIDTH] IN BLOOD BY AUTOMATED COUNT: 16.2 % (ref 11–15)
GLUCOSE BLDC GLUCOMTR-MCNC: 93 MG/DL (ref 70–99)
HCT VFR BLD AUTO: 34.3 %
HGB BLD-MCNC: 11.3 G/DL
HIV 1+2 AB+HIV1 P24 AG SERPL QL IA: NONREACTIVE
IMM GRANULOCYTES # BLD AUTO: 0.05 X10(3) UL (ref 0–1)
IMM GRANULOCYTES NFR BLD: 0.5 %
LYMPHOCYTES # BLD AUTO: 1.5 X10(3) UL (ref 1–4)
LYMPHOCYTES NFR BLD AUTO: 15.3 %
MCH RBC QN AUTO: 24.9 PG (ref 26–34)
MCHC RBC AUTO-ENTMCNC: 32.9 G/DL (ref 31–37)
MCV RBC AUTO: 75.6 FL
MONOCYTES # BLD AUTO: 0.46 X10(3) UL (ref 0.1–1)
MONOCYTES NFR BLD AUTO: 4.7 %
NEUTROPHILS # BLD AUTO: 7.72 X10 (3) UL (ref 1.5–7.7)
NEUTROPHILS # BLD AUTO: 7.72 X10(3) UL (ref 1.5–7.7)
NEUTROPHILS NFR BLD AUTO: 78.6 %
PLATELET # BLD AUTO: 234 10(3)UL (ref 150–450)
RBC # BLD AUTO: 4.54 X10(6)UL
RH BLOOD TYPE: POSITIVE
T PALLIDUM AB SER QL IA: NONREACTIVE
WBC # BLD AUTO: 9.8 X10(3) UL (ref 4–11)

## 2024-06-07 PROCEDURE — 3E033VJ INTRODUCTION OF OTHER HORMONE INTO PERIPHERAL VEIN, PERCUTANEOUS APPROACH: ICD-10-PCS | Performed by: STUDENT IN AN ORGANIZED HEALTH CARE EDUCATION/TRAINING PROGRAM

## 2024-06-07 PROCEDURE — 3E0P7VZ INTRODUCTION OF HORMONE INTO FEMALE REPRODUCTIVE, VIA NATURAL OR ARTIFICIAL OPENING: ICD-10-PCS | Performed by: STUDENT IN AN ORGANIZED HEALTH CARE EDUCATION/TRAINING PROGRAM

## 2024-06-07 RX ORDER — ACETAMINOPHEN 500 MG
500 TABLET ORAL EVERY 6 HOURS PRN
Status: DISCONTINUED | OUTPATIENT
Start: 2024-06-07 | End: 2024-06-09 | Stop reason: HOSPADM

## 2024-06-07 RX ORDER — CITRIC ACID/SODIUM CITRATE 334-500MG
30 SOLUTION, ORAL ORAL AS NEEDED
Status: DISCONTINUED | OUTPATIENT
Start: 2024-06-07 | End: 2024-06-09 | Stop reason: HOSPADM

## 2024-06-07 RX ORDER — SODIUM CHLORIDE, SODIUM LACTATE, POTASSIUM CHLORIDE, CALCIUM CHLORIDE 600; 310; 30; 20 MG/100ML; MG/100ML; MG/100ML; MG/100ML
INJECTION, SOLUTION INTRAVENOUS AS NEEDED
Status: DISCONTINUED | OUTPATIENT
Start: 2024-06-07 | End: 2024-06-09 | Stop reason: HOSPADM

## 2024-06-07 RX ORDER — DIPHENHYDRAMINE HYDROCHLORIDE 50 MG/ML
25 INJECTION INTRAMUSCULAR; INTRAVENOUS NIGHTLY PRN
Status: DISCONTINUED | OUTPATIENT
Start: 2024-06-07 | End: 2024-06-09 | Stop reason: HOSPADM

## 2024-06-07 RX ORDER — ACETAMINOPHEN 500 MG
1000 TABLET ORAL EVERY 6 HOURS PRN
Status: DISCONTINUED | OUTPATIENT
Start: 2024-06-07 | End: 2024-06-09 | Stop reason: HOSPADM

## 2024-06-07 RX ORDER — IBUPROFEN 600 MG/1
600 TABLET ORAL ONCE AS NEEDED
Status: DISCONTINUED | OUTPATIENT
Start: 2024-06-07 | End: 2024-06-09 | Stop reason: HOSPADM

## 2024-06-07 RX ORDER — ONDANSETRON 2 MG/ML
4 INJECTION INTRAMUSCULAR; INTRAVENOUS EVERY 6 HOURS PRN
Status: DISCONTINUED | OUTPATIENT
Start: 2024-06-07 | End: 2024-06-09 | Stop reason: HOSPADM

## 2024-06-07 RX ORDER — METRONIDAZOLE 500 MG/1
500 TABLET ORAL 2 TIMES DAILY
Qty: 14 TABLET | Refills: 0 | Status: ON HOLD | OUTPATIENT
Start: 2024-06-07

## 2024-06-07 RX ORDER — DEXTROSE, SODIUM CHLORIDE, SODIUM LACTATE, POTASSIUM CHLORIDE, AND CALCIUM CHLORIDE 5; .6; .31; .03; .02 G/100ML; G/100ML; G/100ML; G/100ML; G/100ML
INJECTION, SOLUTION INTRAVENOUS CONTINUOUS
Status: DISCONTINUED | OUTPATIENT
Start: 2024-06-07 | End: 2024-06-09 | Stop reason: HOSPADM

## 2024-06-07 RX ORDER — HYDROXYZINE HYDROCHLORIDE 50 MG/ML
50 INJECTION, SOLUTION INTRAMUSCULAR EVERY 6 HOURS PRN
Status: DISCONTINUED | OUTPATIENT
Start: 2024-06-07 | End: 2024-06-08

## 2024-06-07 RX ORDER — LIDOCAINE HYDROCHLORIDE 10 MG/ML
30 INJECTION, SOLUTION EPIDURAL; INFILTRATION; INTRACAUDAL; PERINEURAL ONCE
Status: DISCONTINUED | OUTPATIENT
Start: 2024-06-07 | End: 2024-06-09 | Stop reason: HOSPADM

## 2024-06-07 RX ORDER — TERBUTALINE SULFATE 1 MG/ML
0.25 INJECTION, SOLUTION SUBCUTANEOUS AS NEEDED
Status: DISCONTINUED | OUTPATIENT
Start: 2024-06-07 | End: 2024-06-09 | Stop reason: HOSPADM

## 2024-06-07 RX ORDER — NALBUPHINE HYDROCHLORIDE 10 MG/ML
10 INJECTION, SOLUTION INTRAMUSCULAR; INTRAVENOUS; SUBCUTANEOUS EVERY 6 HOURS PRN
Status: DISCONTINUED | OUTPATIENT
Start: 2024-06-07 | End: 2024-06-08

## 2024-06-07 NOTE — PROGRESS NOTES
Pt is a 33 year old female admitted to LDR1/LDR1-A.     Chief Complaint   Patient presents with    Scheduled Induction     elective      Pt is  39w0d intra-uterine pregnancy.  History obtained, consents signed. Oriented to room, staff, and plan of care.

## 2024-06-07 NOTE — H&P
[facility]  part of [facility]    History and Physical Examination      Subjective   Chief Complaint:  IOL-obesity/risk reducing    HISTORY OF PRESENT ILLNESS:        Patient is a 33 year old y/o   @ 39w0d weeks presents for IOL. She notes + fetal movement, - vaginal bleeding, and  - ROM. Her prenatal course was c/b obesity and preDM, had nml 3hr gtt. Her prenatal care is up to date and reviewed. Patient's GBS is Negative    Lab Results   Component Value Date    GBS Negative 2024           Past Medical History:    Acne    Anxiety    Depression    Depression    Depression prior to infertility issues. Had SI but did not act on it. Got help.     Dyslipidemia    Elevated LFTs    Grief reaction    after miscarriage & ectopic.     History of ectopic pregnancy    Human papilloma virus (HPV) type 9 vaccine administered    Miscarriage ([facility])    2021 Antiphospholipid Ab testing NEGATIVE.     Morbid obesity with BMI of 45.0-49.9, adult ([facility])    Pap smear for cervical cancer screening    Pap & HPV negative.     Pneumonia due to COVID-19 virus    Prediabetes    A1c 5.7% (21)     Wears glasses       History reviewed. No pertinent surgical history.    OB History    Para Term  AB Living   3 0 0 0 2 0   SAB IAB Ectopic Multiple Live Births   1 0 1 0 0   Obstetric Comments   2021 Antiphospholipid Ab testing NEGATIVE.          No current outpatient medications on file.    No Known Allergies    Social History     Socioeconomic History    Marital status:      Spouse name: Not on file    Number of children: Not on file    Years of education: Not on file    Highest education level: Not on file   Occupational History    Not on file   Tobacco Use    Smoking status: Never    Smokeless tobacco: Never   Vaping Use    Vaping status: Never Used   Substance and Sexual Activity    Alcohol use: Yes     Comment: socially    Drug use: Never    Sexual activity: Yes     Partners: Male      Comment: Trying to conceive   Other Topics Concern    Caffeine Concern Not Asked    Exercise Not Asked    Seat Belt Not Asked    Special Diet Not Asked    Stress Concern Not Asked    Weight Concern Not Asked   Social History Narrative    ** Merged History Encounter **          Social Determinants of Health     Financial Resource Strain: Low Risk  (6/7/2024)    Financial Resource Strain     Difficulty of Paying Living Expenses: Not very hard     Med Affordability: No   Food Insecurity: No Food Insecurity (6/7/2024)    Food Insecurity     Food Insecurity: Never true   Transportation Needs: No Transportation Needs (6/7/2024)    Transportation Needs     Lack of Transportation: No     Car Seat: Yes   Stress: Stress Concern Present (6/7/2024)    Stress     Feeling of Stress : Yes   Housing Stability: Low Risk  (6/7/2024)    Housing Stability     Housing Instability: No     Housing Instability Emergency: Not on file     Crib or Bassinette: Yes         Family History   Problem Relation Age of Onset    Diabetes Father     Hypertension Father     Other (bipolar) Father     Other (depression/anxiety) Father     No Known Problems Mother     Other (lupus) Maternal Grandfather     Diabetes Paternal Grandmother     Hypertension Paternal Grandmother     Prostate Cancer Paternal Grandfather 70    Prostate Cancer Maternal Uncle 45    Thyroid disease Sister     Breast Cancer Neg     Ovarian Cancer Neg     Colon Cancer Neg     Infertility Neg     Endometriosis Neg     Birth Defects Neg     Genetic Disease Neg     Blood Clotting Disorder Neg     DVT/VTE Neg     Bipolar Disorder Father     Anxiety Father     Cancer Paternal Grandfather     Cancer Maternal Uncle     Depression Paternal Aunt            Prior to Admission medications    Medication Sig Start Date End Date Taking? Authorizing Provider   valACYclovir 500 MG Oral Tab Take 1 tablet (500 mg total) by mouth daily. 5/10/24  Yes Lb Shane MD   aspirin 81 MG Oral Chew Tab  Chew by mouth daily.   Yes External/Patient, Reported   ferrous sulfate 325 (65 FE) MG Oral Tab EC Take 1 tablet (325 mg total) by mouth daily with breakfast.   Yes External/Patient, Reported   Bphwgv-FgGuw-DiZpg-Meth-FA-DHA (PRENATE MINI) 18-0.6-0.4-350 MG Oral Cap Take 1 capsule by mouth daily. 10/12/23  Yes External/Patient, Reported   ergocalciferol 1.25 MG (19541 UT) Oral Cap Take 1 capsule (50,000 Units total) by mouth once a week. 9/28/23  Yes External/Patient, Reported   metRONIDAZOLE (FLAGYL) 500 MG Oral Tab Take 1 tablet (500 mg total) by mouth 2 (two) times daily. 6/7/24   Lb Shane MD   [facility] [place] [facility]. Devices (BREAST PUMP) Does not apply Misc Double electric breast pump 2/28/24   Lb Shane MD           Objective       ROS   Constitutional: Negative.  Negative for chills and fever.   Respiratory: Negative.  Negative for shortness of breath.    Cardiovascular: Negative for chest pain and palpitations.   Gastrointestinal: Negative for diarrhea, nausea and vomiting.   Genitourinary: Negative.  Negative for dysuria.   Neurological: Negative for dizziness.       Vitals:    Temp: 98.2 °F (36.8 °C)  Pulse: 65  Resp: 16  BP: 114/77     Physical Exam   Constitutional: She appears well-developed and well-nourished.   Cardiovascular: Normal rate.   Pulmonary/Chest: Effort normal.   Abdominal: Soft.   Genitourinary: Uterus normal.   Neurological: She is alert.   Skin: Skin is warm.   Psychiatric: She has a normal mood and affect.       CE:  0/0/-3/soft    Alcantar Score: 3.    EFM:   Baseline 150, + Accels, - Decels, mod Variability    [place]:  acontractile      Lab Results   Component Value Date    ABO BLOOD TYPE O 06/07/2024    RH BLOOD TYPE Positive 06/07/2024    HGB 11.3 (L) 06/07/2024    .0 06/07/2024    HCT 34.3 (L) 06/07/2024    Rubella IgG Positive 12/08/2023    Treponemal Antibodies Nonreactive 06/07/2024    HIV Antigen Antibody Combo Non-Reactive 04/02/2024            ASSESSMENT AND PLAN:       Active Problems:    Pregnancy (HCC)        Patient admitted for IOL, start cytotec and CB   Cook catheter introduced through cervical os with stylet.  Uterine balloon inflated with 80ml normal saline.  Vaginal balloon inflated with 60mL.  Stylet removed.   EFM Cat 1.    Patient Tolerance:  Tolerated      Pain medication as desired  Good FM noted, fetal monitoring strip reviewed and reassuring.      Heide Jose MD

## 2024-06-08 ENCOUNTER — ANESTHESIA EVENT (OUTPATIENT)
Dept: OBGYN UNIT | Facility: HOSPITAL | Age: 34
End: 2024-06-08
Payer: COMMERCIAL

## 2024-06-08 ENCOUNTER — ANESTHESIA (OUTPATIENT)
Dept: OBGYN UNIT | Facility: HOSPITAL | Age: 34
End: 2024-06-08
Payer: COMMERCIAL

## 2024-06-08 PROCEDURE — 10907ZC DRAINAGE OF AMNIOTIC FLUID, THERAPEUTIC FROM PRODUCTS OF CONCEPTION, VIA NATURAL OR ARTIFICIAL OPENING: ICD-10-PCS | Performed by: STUDENT IN AN ORGANIZED HEALTH CARE EDUCATION/TRAINING PROGRAM

## 2024-06-08 RX ORDER — BUPIVACAINE HCL/0.9 % NACL/PF 0.25 %
5 PLASTIC BAG, INJECTION (ML) EPIDURAL AS NEEDED
Status: DISCONTINUED | OUTPATIENT
Start: 2024-06-08 | End: 2024-06-09

## 2024-06-08 RX ORDER — LIDOCAINE HYDROCHLORIDE AND EPINEPHRINE 15; 5 MG/ML; UG/ML
INJECTION, SOLUTION EPIDURAL
Status: COMPLETED | OUTPATIENT
Start: 2024-06-08 | End: 2024-06-08

## 2024-06-08 RX ORDER — HYDROXYZINE HYDROCHLORIDE 50 MG/ML
50 INJECTION, SOLUTION INTRAMUSCULAR EVERY 4 HOURS PRN
Status: DISCONTINUED | OUTPATIENT
Start: 2024-06-08 | End: 2024-06-09 | Stop reason: HOSPADM

## 2024-06-08 RX ORDER — NALBUPHINE HYDROCHLORIDE 10 MG/ML
2.5 INJECTION, SOLUTION INTRAMUSCULAR; INTRAVENOUS; SUBCUTANEOUS
Status: DISCONTINUED | OUTPATIENT
Start: 2024-06-08 | End: 2024-06-09

## 2024-06-08 RX ORDER — BUPIVACAINE HYDROCHLORIDE 2.5 MG/ML
INJECTION, SOLUTION EPIDURAL; INFILTRATION; INTRACAUDAL
Status: COMPLETED | OUTPATIENT
Start: 2024-06-08 | End: 2024-06-08

## 2024-06-08 RX ORDER — BUPIVACAINE HCL/0.9 % NACL/PF 0.25 %
5 PLASTIC BAG, INJECTION (ML) EPIDURAL AS NEEDED
Status: DISCONTINUED | OUTPATIENT
Start: 2024-06-08 | End: 2024-06-08

## 2024-06-08 RX ORDER — BUPIVACAINE HYDROCHLORIDE 2.5 MG/ML
20 INJECTION, SOLUTION EPIDURAL; INFILTRATION; INTRACAUDAL ONCE
Status: DISCONTINUED | OUTPATIENT
Start: 2024-06-08 | End: 2024-06-09 | Stop reason: HOSPADM

## 2024-06-08 RX ORDER — NALBUPHINE HYDROCHLORIDE 10 MG/ML
10 INJECTION, SOLUTION INTRAMUSCULAR; INTRAVENOUS; SUBCUTANEOUS EVERY 4 HOURS PRN
Status: DISCONTINUED | OUTPATIENT
Start: 2024-06-08 | End: 2024-06-09 | Stop reason: HOSPADM

## 2024-06-08 RX ORDER — LIDOCAINE HYDROCHLORIDE 10 MG/ML
INJECTION, SOLUTION INFILTRATION; PERINEURAL
Status: COMPLETED | OUTPATIENT
Start: 2024-06-08 | End: 2024-06-08

## 2024-06-08 RX ORDER — NALBUPHINE HYDROCHLORIDE 10 MG/ML
2.5 INJECTION, SOLUTION INTRAMUSCULAR; INTRAVENOUS; SUBCUTANEOUS
Status: DISCONTINUED | OUTPATIENT
Start: 2024-06-08 | End: 2024-06-08

## 2024-06-08 RX ADMIN — LIDOCAINE HYDROCHLORIDE 5 ML: 10 INJECTION, SOLUTION INFILTRATION; PERINEURAL at 12:58:00

## 2024-06-08 RX ADMIN — LIDOCAINE HYDROCHLORIDE AND EPINEPHRINE 5 ML: 15; 5 INJECTION, SOLUTION EPIDURAL at 12:58:00

## 2024-06-08 RX ADMIN — BUPIVACAINE HYDROCHLORIDE 5 ML: 2.5 INJECTION, SOLUTION EPIDURAL; INFILTRATION; INTRACAUDAL at 12:58:00

## 2024-06-08 NOTE — PROGRESS NOTES
Labor Progress Note      Subjective     Patient 33 year old y/o   @ 39w1d weeks in labor & delivery.  Coping well. Starting to get uncomfortable. Consents to arom if able.  Patient's GBS is Negative    Lab Results   Component Value Date    GBS Negative 2024               Objective   Vitals:    Temp: 98.2 °F (36.8 °C)  Pulse: 60  Resp: 18  BP: 111/68       Physical Exam     CE:  4/70/-2/soft    EFM:   Baseline 135, + Accels, - Decels, mod Variability    [place]:  CTX every 5 min,             ASSESSMENT AND PLAN:        Active Problems:    Pregnancy (HCC)   S/p arom, meconium noted  Pitocin reduced from 20mu to 10mu, cont per protocol  SVE in 6hrs or prn   Cat 1  Epidural prn       Heide Jose MD

## 2024-06-08 NOTE — ANESTHESIA PREPROCEDURE EVALUATION
Anesthesia PreOp Note    HPI:     Kasandra Rodrigues is a 33 year old female who presents for preoperative consultation requested by: * No surgeons listed *    Date of Surgery: 6/8/2024    * No procedures listed *  Indication: * No pre-op diagnosis entered *    Relevant Problems   No relevant active problems       NPO:                         History Review:  Patient Active Problem List    Diagnosis Date Noted   • Pregnancy ([facility]) 06/07/2024   • AMA (advanced maternal age) multigravida 35+, third trimester ([facility]) [O09.523] 04/26/2024   • Renal agenesis of fetus affecting antepartum care of mother ([facility]) 04/26/2024   • Hypercholesterolemia 09/03/2021   • Ectopic pregnancy ([facility]) 09/03/2021   • Diabetes mellitus ([facility]) 09/03/2021   • Right ovarian cyst 07/07/2021   • Prediabetes 07/07/2021   • Elevated LFTs 07/07/2021   • Dyslipidemia 07/07/2021   • Menorrhagia with regular cycle 06/19/2021   • Anxiety 06/19/2021   • Depression 06/19/2021   • Grief reaction 06/19/2021   • History of miscarriage 06/19/2021   • Infertility, female, secondary 06/19/2021   • Morbid obesity with BMI of 45.0-49.9, adult ([facility]) 06/19/2021   • Acanthosis nigricans 06/19/2021   • Social anxiety disorder    • Major depressive disorder, single episode, severe without psychosis ([facility]) 04/22/2018   • Morbid obesity ([facility]) 04/22/2018   • Elevated alkaline phosphatase level 04/22/2018   • Anxiety disorder, unspecified 04/22/2018       Past Medical History:   • Acne   • Anxiety   • Depression   • Depression    Depression prior to infertility issues. Had SI but did not act on it. Got help.    • Dyslipidemia   • Elevated LFTs   • Grief reaction    after miscarriage & ectopic.    • History of ectopic pregnancy   • Human papilloma virus (HPV) type 9 vaccine administered   • Miscarriage ([facility])    7/7/2021 Antiphospholipid Ab testing NEGATIVE.    • Morbid obesity with BMI of 45.0-49.9, adult ([facility])   • Pap smear for cervical cancer screening    Pap & HPV negative.     • Pneumonia due to COVID-19 virus   • Prediabetes    A1c 5.7% (7/7/21)    • Wears glasses       History reviewed. No pertinent surgical history.    Medications Prior to Admission   Medication Sig Dispense Refill Last Dose   • valACYclovir 500 MG Oral Tab Take 1 tablet (500 mg total) by mouth daily. 30 tablet 0 6/6/2024   • aspirin 81 MG Oral Chew Tab Chew by mouth daily.   Past Week   • ferrous sulfate 325 (65 FE) MG Oral Tab EC Take 1 tablet (325 mg total) by mouth daily with breakfast.   6/6/2024   • Yvtlkj-RxTcb-OmOpw-Meth-FA-DHA (PRENATE MINI) 18-0.6-0.4-350 MG Oral Cap Take 1 capsule by mouth daily.   6/6/2024   • ergocalciferol 1.25 MG (79645 UT) Oral Cap Take 1 capsule (50,000 Units total) by mouth once a week.   Past Week   • metRONIDAZOLE (FLAGYL) 500 MG Oral Tab Take 1 tablet (500 mg total) by mouth 2 (two) times daily. 14 tablet 0 Unknown   • Misc. Devices (BREAST PUMP) Does not apply Misc Double electric breast pump 1 each 0      Current Facility-Administered Medications Ordered in Epic   Medication Dose Route Frequency Provider Last Rate Last Admin   • nalbuphine (Nubain) 10 mg/mL injection 10 mg  10 mg Intramuscular Q4H PRN Heide Jose MD        And   • hydrOXYzine 50 mg/mL injection 50 mg  50 mg Intramuscular Q4H PRN Heide Jose MD       • fentaNYL-bupivacaine 2 mcg/mL-0.125% in sodium chloride 0.9% 100 mL EPIDURAL infusion premix   Epidural Continuous Dean Casillas MD       • fentaNYL (Sublimaze) 50 mcg/mL injection 100 mcg  100 mcg Epidural Once Dean Casillas MD       • bupivacaine PF (Marcaine) 0.25% injection  20 mL Epidural Once Dean Casillas MD       • EPHEDrine (PF) 25 MG/5 ML injection 5 mg  5 mg Intravenous PRN Dean Casillas MD       • nalbuphine (Nubain) 10 mg/mL injection 2.5 mg  2.5 mg Intravenous Q15 Min PRN Dean Casillas MD       • dextrose in lactated ringers 5% infusion   Intravenous Continuous Heide Jose MD       • lactated ringers infusion    Intravenous PRN Heide Jose  mL/hr at 06/07/24 0944 125 mL/hr at 06/07/24 0944   • lactated ringers IV bolus 500 mL  500 mL Intravenous PRN Heide Jose MD       • acetaminophen (Tylenol Extra Strength) tab 500 mg  500 mg Oral Q6H PRN Heide Jose MD       • acetaminophen (Tylenol Extra Strength) tab 1,000 mg  1,000 mg Oral Q6H PRN Heide Jose MD       • ibuprofen (Motrin) tab 600 mg  600 mg Oral Once PRN Heide Jose MD       • ondansetron (Zofran) 4 MG/2ML injection 4 mg  4 mg Intravenous Q6H PRN Heide Jose MD       • oxyTOCIN in sodium chloride 0.9% (Pitocin) 30 Units/500mL infusion premix  62.5 annie-units/min Intravenous Continuous Heide Jose MD   Held at 06/07/24 0830   • terbutaline (Brethine) 1 MG/ML injection 0.25 mg  0.25 mg Subcutaneous PRN Heide Jose MD       • sodium citrate-citric acid (Bicitra) 500-334 MG/5ML oral solution 30 mL  30 mL Oral PRN Heide Jose MD       • lidocaine PF (Xylocaine-MPF) 1% injection  30 mL Intradermal Once Heide Jose MD       • fentaNYL (Sublimaze) 50 mcg/mL injection 50 mcg  50 mcg Intravenous Q30 Min PRN Heide Jose MD       • diphenhydrAMINE (Benadryl) 50 mg/mL  injection 25 mg  25 mg Intravenous Nightly PRN Heide Jose MD       • oxyTOCIN in sodium chloride 0.9% (Pitocin) 30 Units/500mL infusion premix  0.5-20 annie-units/min Intravenous Continuous Heide Jose MD 10 mL/hr at 06/08/24 1130 10 annie-units/min at 06/08/24 1130     No current Epic-ordered outpatient medications on file.       No Known Allergies    Family History   Problem Relation Age of Onset   • Diabetes Father    • Hypertension Father    • Other (bipolar) Father    • Other (depression/anxiety) Father    • No Known Problems Mother    • Other (lupus) Maternal Grandfather    • Diabetes Paternal Grandmother    • Hypertension Paternal Grandmother    • Prostate Cancer Paternal Grandfather 70   • Prostate Cancer  Maternal Uncle 45   • Thyroid disease Sister    • Breast Cancer Neg    • Ovarian Cancer Neg    • Colon Cancer Neg    • Infertility Neg    • Endometriosis Neg    • Birth Defects Neg    • Genetic Disease Neg    • Blood Clotting Disorder Neg    • DVT/VTE Neg    • Bipolar Disorder Father    • Anxiety Father    • Cancer Paternal Grandfather    • Cancer Maternal Uncle    • Depression Paternal Aunt      Social History     Socioeconomic History   • Marital status:    Tobacco Use   • Smoking status: Never   • Smokeless tobacco: Never   Vaping Use   • Vaping status: Never Used   Substance and Sexual Activity   • Alcohol use: Yes     Comment: socially   • Drug use: Never   • Sexual activity: Yes     Partners: Male     Comment: Trying to conceive       Available pre-op labs reviewed.  Lab Results   Component Value Date    WBC 9.8 06/07/2024    RBC 4.54 06/07/2024    HGB 11.3 (L) 06/07/2024    HCT 34.3 (L) 06/07/2024    MCV 75.6 (L) 06/07/2024    MCH 24.9 (L) 06/07/2024    MCHC 32.9 06/07/2024    RDW 16.2 (H) 06/07/2024    .0 06/07/2024     Lab Results   Component Value Date    PGLU 93 06/07/2024          Vital Signs:  Body mass index is 44.19 kg/m².   weight is 124.2 kg (273 lb 12.8 oz). Her oral temperature is 98.2 °F (36.8 °C). Her blood pressure is 111/68 and her pulse is 60. Her respiration is 18 and oxygen saturation is 97%.   Vitals:    06/07/24 2130 06/08/24 0050 06/08/24 0415 06/08/24 0730   BP: 102/46 103/50 106/70 111/68   Pulse: 64 64 63 60   Resp: 18 18 18 18   Temp: 98.2 °F (36.8 °C) 98 °F (36.7 °C) 98.3 °F (36.8 °C) 98.2 °F (36.8 °C)   TempSrc: Oral Oral Oral Oral   SpO2: 97%      Weight:            Anesthesia Evaluation     Patient summary reviewed and Nursing notes reviewed    Airway   Mallampati: II  TM distance: >3 FB  Neck ROM: full  Dental - Dentition appears grossly intact     Pulmonary - negative ROS and normal exam   Cardiovascular - negative ROS and normal exam    Neuro/Psych    (+)   anxiety/panic attacks,  depression      GI/Hepatic/Renal - negative ROS     Endo/Other    (+) diabetes mellitus  Abdominal  - normal exam               Anesthesia Plan:   ASA:  2  Plan:   Epidural  Informed Consent Plan and Risks Discussed With:  Patient    I have informed Kasandra ALVAREZ Ravi and/or legal guardian or family member of the nature of the anesthetic plan, benefits, risks including possible dental damage if relevant, major complications, and any alternative forms of anesthetic management.   All of the patient's questions were answered to the best of my ability. The patient desires the anesthetic management as planned.  MARIANA THORNTON MD  6/8/2024 1:08 PM  Present on Admission:  **None**

## 2024-06-08 NOTE — ANESTHESIA PROCEDURE NOTES
Labor Analgesia    Date/Time: 6/8/2024 12:58 PM    Performed by: Dean Casillas MD  Authorized by: Dean Casillas MD      General Information and Staff    Start Time:  6/8/2024 12:58 PM  End Time:  6/8/2024 1:09 PM  Anesthesiologist:  Dean Casillas MD  Performed by:  Anesthesiologist  Patient Location:  OB  Site Identification: surface landmarks    Reason for Block: labor epidural    Preanesthetic Checklist: patient identified, IV checked, site marked, risks and benefits discussed, monitors and equipment checked, pre-op evaluation, timeout performed, anesthesia consent and sterile technique used      Procedure Details    Patient Position:  Sitting  Prep: ChloraPrep    Monitoring:  Heart rate  Approach:  Midline    Epidural Needle    Injection Technique:  DG air  Needle Type:  Tuohy  Needle Gauge:  18 G  Needle Length:  3.5 in  Needle Insertion Depth:  8  Location:  L3-4    Spinal Needle      Catheter    Catheter Type:  Multi-orifice  Catheter Size:  20 G  Catheter at Skin Depth:  13  Test Dose:  Negative    Assessment    Sensory Level:  T6    Additional Comments     First pass test neg  motor intact

## 2024-06-09 PROCEDURE — 10D17Z9 MANUAL EXTRACTION OF PRODUCTS OF CONCEPTION, RETAINED, VIA NATURAL OR ARTIFICIAL OPENING: ICD-10-PCS | Performed by: STUDENT IN AN ORGANIZED HEALTH CARE EDUCATION/TRAINING PROGRAM

## 2024-06-09 PROCEDURE — 59410 OBSTETRICAL CARE: CPT | Performed by: STUDENT IN AN ORGANIZED HEALTH CARE EDUCATION/TRAINING PROGRAM

## 2024-06-09 PROCEDURE — 0KQM0ZZ REPAIR PERINEUM MUSCLE, OPEN APPROACH: ICD-10-PCS | Performed by: STUDENT IN AN ORGANIZED HEALTH CARE EDUCATION/TRAINING PROGRAM

## 2024-06-09 RX ORDER — CHOLECALCIFEROL (VITAMIN D3) 25 MCG
1 TABLET,CHEWABLE ORAL DAILY
Status: DISCONTINUED | OUTPATIENT
Start: 2024-06-09 | End: 2024-06-11

## 2024-06-09 RX ORDER — CARBOPROST TROMETHAMINE 250 UG/ML
INJECTION, SOLUTION INTRAMUSCULAR
Status: DISCONTINUED
Start: 2024-06-09 | End: 2024-06-09 | Stop reason: WASHOUT

## 2024-06-09 RX ORDER — BENZOCAINE/MENTHOL 6 MG-10 MG
1 LOZENGE MUCOUS MEMBRANE EVERY 6 HOURS PRN
Status: DISCONTINUED | OUTPATIENT
Start: 2024-06-09 | End: 2024-06-11

## 2024-06-09 RX ORDER — MISOPROSTOL 200 UG/1
TABLET ORAL
Status: DISCONTINUED
Start: 2024-06-09 | End: 2024-06-09 | Stop reason: WASHOUT

## 2024-06-09 RX ORDER — ACETAMINOPHEN 500 MG
1000 TABLET ORAL EVERY 6 HOURS PRN
Status: DISCONTINUED | OUTPATIENT
Start: 2024-06-09 | End: 2024-06-11

## 2024-06-09 RX ORDER — SIMETHICONE 80 MG
80 TABLET,CHEWABLE ORAL 3 TIMES DAILY PRN
Status: DISCONTINUED | OUTPATIENT
Start: 2024-06-09 | End: 2024-06-11

## 2024-06-09 RX ORDER — ONDANSETRON 2 MG/ML
4 INJECTION INTRAMUSCULAR; INTRAVENOUS EVERY 6 HOURS PRN
Status: DISCONTINUED | OUTPATIENT
Start: 2024-06-09 | End: 2024-06-11

## 2024-06-09 RX ORDER — BISACODYL 10 MG
10 SUPPOSITORY, RECTAL RECTAL ONCE AS NEEDED
Status: DISCONTINUED | OUTPATIENT
Start: 2024-06-09 | End: 2024-06-11

## 2024-06-09 RX ORDER — AMMONIA INHALANTS 0.04 G/.3ML
0.3 INHALANT RESPIRATORY (INHALATION) AS NEEDED
Status: DISCONTINUED | OUTPATIENT
Start: 2024-06-09 | End: 2024-06-11

## 2024-06-09 RX ORDER — METHYLERGONOVINE MALEATE 0.2 MG/ML
INJECTION INTRAVENOUS
Status: DISCONTINUED
Start: 2024-06-09 | End: 2024-06-09 | Stop reason: WASHOUT

## 2024-06-09 RX ORDER — ACETAMINOPHEN 500 MG
500 TABLET ORAL EVERY 6 HOURS PRN
Status: DISCONTINUED | OUTPATIENT
Start: 2024-06-09 | End: 2024-06-11

## 2024-06-09 RX ORDER — IBUPROFEN 600 MG/1
600 TABLET ORAL EVERY 6 HOURS
Status: DISCONTINUED | OUTPATIENT
Start: 2024-06-09 | End: 2024-06-11

## 2024-06-09 RX ORDER — DOCUSATE SODIUM 100 MG/1
100 CAPSULE, LIQUID FILLED ORAL
Status: DISCONTINUED | OUTPATIENT
Start: 2024-06-09 | End: 2024-06-11

## 2024-06-09 RX ORDER — TRANEXAMIC ACID 10 MG/ML
INJECTION, SOLUTION INTRAVENOUS
Status: DISCONTINUED
Start: 2024-06-09 | End: 2024-06-09 | Stop reason: WASHOUT

## 2024-06-09 NOTE — PROGRESS NOTES
Patient up to bathroom with assist x 2.  Unable to void at this time. Patient transferred to mother/baby room 358 per wheelchair in stable condition with baby and personal belongings.  Accompanied by significant other and staff.  Report given to Charis mother/baby RN.

## 2024-06-09 NOTE — PROGRESS NOTES
PROGRESS NOTE        Date:     2024    Patient: Kasandra Rodrigues       :    1990  Age:     33 year old      Gender: female  MRN:  [id number]    Subjective :  The patient is 33 year old y/o  Postpartum day #0 from a vaginal delivery.  She is doing well.  Lochia normal.  Pain controlled.  Breastfeeding without difficulty.       Objective   Physical Exam:  BP 97/61 (BP Location: Right arm)   Pulse 59   Temp 97.8 °F (36.6 °C) (Oral)   Resp 15   Wt 273 lb 12.8 oz (124.2 kg)   LMP 2023 (Exact Date)   SpO2 100%   Breastfeeding Yes   BMI 44.19 kg/m²     Intake/Output Summary (Last 24 hours) at 2024 1210  Last data filed at 2024 0954  Gross per 24 hour   Intake --   Output 2318 ml   Net -2318 ml     Abdomen - soft, ND, NT  Fundus -firm, NT  Lower Extremities - NT    Result Data:  Lab Results   Component Value Date    WBC 9.8 2024    RBC 4.54 2024    HGB 11.3 (L) 2024    HCT 34.3 (L) 2024    .0 2024    GLU 95 2021    BUN 9 2021     2021    K 4.0 2021     2021    CA 9.2 2021    CO2 27 2021     Abnormal Labs Reviewed   CBC W/ DIFFERENTIAL - Abnormal; Notable for the following components:       Result Value    HGB 11.3 (*)     HCT 34.3 (*)     MCV 75.6 (*)     MCH 24.9 (*)     RDW 16.2 (*)     Neutrophil Absolute Prelim 7.72 (*)     Neutrophil Absolute 7.72 (*)     All other components within normal limits             Assessment and Plan:    Active Problems:    Normal delivery at term (HCC)        PPD # 0  Cont PP care.  Ambulate.  #hx of depression: coping well.  - sw consult      Heide Jsoe MD

## 2024-06-09 NOTE — PLAN OF CARE
Problem: PAIN - ADULT  Goal: Verbalizes/displays adequate comfort level or patient's stated pain goal  Description: INTERVENTIONS:  - Encourage pt to monitor pain and request assistance  - Assess pain using appropriate pain scale  - Administer analgesics based on type and severity of pain and evaluate response  - Implement non-pharmacological measures as appropriate and evaluate response  - Consider cultural and social influences on pain and pain management  - Manage/alleviate anxiety  - Utilize distraction and/or relaxation techniques  - Monitor for opioid side effects  - Notify MD/LIP if interventions unsuccessful or patient reports new pain  - Anticipate increased pain with activity and pre-medicate as appropriate  Outcome: Progressing     Problem: ANXIETY  Goal: Will report anxiety at manageable levels  Description: INTERVENTIONS:  - Administer medication as ordered  - Teach and rehearse alternative coping skills  - Provide emotional support with 1:1 interaction with staff  Outcome: Progressing     Problem: Patient Centered Care  Goal: Patient preferences are identified and integrated in the patient's plan of care  Description: Interventions:  - What would you like us to know as we care for you?   - Provide timely, complete, and accurate information to patient/family  - Incorporate patient and family knowledge, values, beliefs, and cultural backgrounds into the planning and delivery of care  - Encourage patient/family to participate in care and decision-making at the level they choose  - Honor patient and family perspectives and choices  Outcome: Progressing     Problem: Patient/Family Goals  Goal: Patient/Family Long Term Goal  Description: Patient's Long Term Goal:     Interventions:  -   - See additional Care Plan goals for specific interventions  Outcome: Progressing  Goal: Patient/Family Short Term Goal  Description: Patient's Short Term Goal:     Interventions:   -   - See additional Care Plan goals for  specific interventions  Outcome: Progressing     Problem: SKIN/TISSUE INTEGRITY - ADULT  Goal: Skin integrity remains intact  Description: INTERVENTIONS  - Assess and document risk factors for pressure ulcer development  - Assess and document skin integrity  - Monitor for areas of redness and/or skin breakdown  - Initiate interventions, skin care algorithm/standards of care as needed  Outcome: Progressing  Goal: Incision(s), wounds(s) or drain site(s) healing without S/S of infection  Description: INTERVENTIONS:  - Assess and document risk factors for pressure ulcer development  - Assess and document skin integrity  - Assess and document dressing/incision, wound bed, drain sites and surrounding tissue  - Implement wound care per orders  - Initiate isolation precautions as appropriate  - Initiate Pressure Ulcer prevention bundle as indicated  Outcome: Progressing  Goal: Oral mucous membranes remain intact  Description: INTERVENTIONS  - Assess oral mucosa and hygiene practices  - Implement preventative oral hygiene regimen  - Implement oral medicated treatments as ordered  Outcome: Progressing     Problem: POSTPARTUM  Goal: Long Term Goal:Experiences normal postpartum course  Description: INTERVENTIONS:  - Assess and monitor vital signs and lab values.  - Assess fundus and lochia.  - Provide ice/sitz baths for perineum discomfort.  - Monitor healing of incision/episiotomy/laceration, and assess for signs and symptoms of infection and hematoma.  - Assess bladder function and monitor for bladder distention.  - Provide/instruct/assist with pericare as needed.  - Provide VTE prophylaxis as needed.  - Monitor bowel function.  - Encourage ambulation and provide assistance as needed.  - Assess and monitor emotional status and provide social service/psych resources as needed.  - Utilize standard precautions and use personal protective equipment as indicated. Ensure aseptic care of all intravenous lines and invasive  tubes/drains.  - Obtain immunization and exposure to communicable diseases history.  Outcome: Progressing  Goal: Optimize infant feeding at the breast  Description: INTERVENTIONS:  - Initiate breast feeding within first hour after birth.   - Monitor effectiveness of current breast feeding efforts.  - Assess support systems available to mother/family.  - Identify cultural beliefs/practices regarding lactation, letdown techniques, maternal food preferences.  - Assess mother's knowledge and previous experience with breast feeding.  - Provide information as needed about early infant feeding cues (e.g., rooting, lip smacking, sucking fingers/hand) versus late cue of crying.  - Discuss/demonstrate breast feeding aids (e.g., infant sling, nursing footstool/pillows, and breast pumps).  - Encourage mother/other family members to express feelings/concerns, and actively listen.  - Educate father/SO about benefits of breast feeding and how to manage common lactation challenges.  - Recommend avoidance of specific medications or substances incompatible with breast feeding.  - Assess and monitor for signs of nipple pain/trauma.  - Instruct and provide assistance with proper latch.  - Review techniques for milk expression (breast pumping) and storage of breast milk. Provide pumping equipment/supplies, instructions and assistance, as needed.  - Encourage rooming-in and breast feeding on demand.  - Encourage skin-to-skin contact.  - Provide LC support as needed.  - Assess for and manage engorgement.  - Provide breast feeding education handouts and information on community breast feeding support.   Outcome: Progressing  Goal: Establishment of adequate milk supply with medication/procedure interruptions  Description: INTERVENTIONS:  - Review techniques for milk expression (breast pumping).   - Provide pumping equipment/supplies, instructions, and assistance until it is safe to breastfeed infant.  Outcome: Progressing  Goal: Experiences  normal breast weaning course  Description: INTERVENTIONS:  - Assess for and manage engorgement.  - Instruct on breast care.  - Provide comfort measures.  Outcome: Progressing  Goal: Appropriate maternal -  bonding  Description: INTERVENTIONS:  - Assess caregiver- interactions.  - Assess caregiver's emotional status and coping mechanisms.  - Encourage caregiver to participate in  daily care.  - Assess support systems available to mother/family.  - Provide /case management support as needed.  Outcome: Progressing     Problem: BIRTH - VAGINAL/ SECTION  Goal: Fetal and maternal status remain reassuring during the birth process  Description: INTERVENTIONS:  - Monitor vital signs  - Monitor fetal heart rate  - Monitor uterine activity  - Monitor labor progression (vaginal delivery)  - DVT prophylaxis (C/S delivery)  - Surgical antibiotic prophylaxis (C/S delivery)  Outcome: Completed

## 2024-06-09 NOTE — ANESTHESIA POSTPROCEDURE EVALUATION
Patient: Kasandra Rodrigues    Procedure Summary       Date: 06/08/24 Room / Location:     Anesthesia Start: 1258 Anesthesia Stop: 06/09/24 0159    Procedure: LABOR ANALGESIA Diagnosis:     Scheduled Providers:  Anesthesiologist: Mariana Thornton MD    Anesthesia Type: epidural ASA Status: 2            Anesthesia Type: epidural    Vitals Value Taken Time   /63 06/09/24 0247   Temp 98.4 °F (36.9 °C) 06/09/24 0200   Pulse 112 06/09/24 0247   Resp 18 06/09/24 0200   SpO2 100 % 06/09/24 0200       [facility] AN Post Evaluation:   Patient Evaluated in PACU  Patient Participation: complete - patient participated  Level of Consciousness: awake  Pain Score: 0  Pain Management: adequate  Airway Patency:patent  Dental exam unchanged from preop  Yes    Cardiovascular Status: acceptable  Respiratory Status: acceptable  Postoperative Hydration acceptable    MARIANA THORNTON MD  6/9/2024 6:18 AM

## 2024-06-09 NOTE — LACTATION NOTE
LACTATION NOTE - MOTHER      Evaluation Type: Inpatient    Problems identified  Problems identified: Knowledge deficit;Unable to acheive sustained latch  Problems Identified Other:  (infant alesha +)    Maternal history  Maternal history: Depression;Obesity    Breastfeeding goal  Breastfeeding goal: To maintain breast milk feeding per patient goal (started pumping due to infant needing supplmentation for alesha +)    Maternal Assessment  Bilateral Breasts: Pendulous  Bilateral Nipples: Everted  Prior breastfeeding experience (comment below): Primip

## 2024-06-09 NOTE — L&D DELIVERY NOTE
[facility]  part of [facility]    Vaginal Delivery Note    Kasandra Rodrigues Patient Status:  Inpatient    1990 MRN [id number]   Location [facility] Attending Heide Jose MD   Hosp Day # 2 PCP Lb Shane MD     Delivery     Infant  Date of Delivery: 2024    Time of Delivery: 1:49 AM   Delivery Type: Normal spontaneous vaginal delivery     Infant Sex  Information for the patient's :  Khadar Rodrigues [[id number]]   female     Infant Birthweight  Information for the patient's :  Khadar Rodrigues [[id number]]   7 lb 3.3 oz (3.27 kg)      Presentation Vertex [1]   Position Right [3]  Occiput [1]  Anterior [1]     Apgars:  1 minute: 9                5 minutes: 9                         10 minutes:      Placenta  Date/Time of Delivery: 2024  1:59 AM    Delivery: spontaneous   Induction or Augmentation: cytotec, CB, pitocin  Placenta to Pathology: no  Cord Gases Submitted: no  Cord Blood Collection: yes  Cord Tissue Collection: yes  Cord Complications: none  Sponge and Needle Counts:  Verified yes      Maternal Anesthesia: epidural   Episiotomy/Laceration Repair  Laceration: perineal 2nd degree    Delivery Complications  none    Neonatologist Present: no  [place] Status: Stable   Maternal Status: Stable   Delivery Comment:  with 2nd degree lac repaired with 2-0 vicryl, manual extraction of placenta due to cord avulsion. excellent hemostasis       Intake/Output   QBL:  See nursing documentation below        Heide Jose MD, MD  2024  2:16 AM      Khadar Rodrigues [[id number]]      Labor Events     labor?: No   steroids?: None  Antibiotics received during labor?: No  Rupture date/time: 2024 1130     Rupture type: AROM  Fluid color: Meconium  Labor type: Induced Onset of Labor  Induction: Misoprostol, Cervical Ripening Balloon, Oxytocin, AROM  Indications for induction: Other - comment  Induction  comment: High BMI  Intrapartum & labor complications: Meconium       Labor Event Times    Labor onset date/time: 2024 1200  Dilation complete date/time: 2024 0042  Start pushing date/time: 2024 0133        Presentation    Presentation: Vertex  Position: Right Occiput Anterior       Operative Delivery    Operative Vaginal Delivery: No                Shoulder Dystocia    Shoulder Dystocia: No       Anesthesia    Method: Epidural              [place] Delivery      Head delivery date/time: 2024 01:49:26   Delivery date/time:  24 01:49:30   Delivery type: Normal spontaneous vaginal delivery    Details:     Delivery location: delivery room  Delivery Room Temperature: 73       Delivery Providers    Delivering Clinician: Heide Jose MD   Delivery personnel:  Provider Role   Neena Spivey, RN Baby Nurse   Stephanie Florez, RN Delivery Nurse   Emma Hagen Surgical Tech             Cord    Vessels: 3 Vessels  Complications: None  Timed cord clamping: Yes  Time in sec: 30  Cord blood disposition: to lab  Gases sent?: No       Resuscitation    Method: None       [place] Measurements      Weight: 3270 g 7 lb 3.3 oz Length: 52.1 cm     Head circum.: 34.5 cm              Placenta    Date/time: 2024 0159  Removal: Manual Removal  Appearance: Intact  Disposition: Discarded       Apgars    Living status: Living   Apgar Scoring Key:    0 1 2    Skin color Blue or pale Acrocyanotic Completely pink    Heart rate Absent <100 bpm >100 bpm    Reflex irritability No response Grimace Cry or active withdrawal    Muscle tone Limp Some flexion Active motion    Respiratory effort Absent Weak cry; hypoventilation Good, crying              1 Minute:  5 Minute:  10 Minute:  15 Minute:  20 Minute:      Skin color: 1  1       Heart rate: 2  2       Reflex irritablity: 2  2       Muscle tone: 2  2       Respiratory effort: 2  2       Total: 9  9          Apgars assigned by: NEENA CASTRO RN    disposition: with mother       Skin to Skin    Skin to skin initiated date/time: 6/9/2024 0203  Skin to skin with: Mother       Vaginal Count    Initial count RN: Colin Escobedo RN  Initial count Tech: Ashish Garsia    Initial counts 10   0    Final counts        Final count RN: Stephanie Florez RN  Final count MD: Heide Jose MD       Lacerations    Episiotomy: None  Perineal lacerations: 2nd Repaired?: Yes     Vaginal laceration?: No      Cervical laceration?: No    Clitoral laceration?: No

## 2024-06-10 PROBLEM — N92.0 MENORRHAGIA WITH REGULAR CYCLE: Status: RESOLVED | Noted: 2021-06-19 | Resolved: 2024-06-10

## 2024-06-10 NOTE — LACTATION NOTE
LACTATION NOTE - MOTHER      Evaluation Type: Inpatient    Problems identified  Problems identified: Knowledge deficit;Milk supply not WNL  Milk supply not WNL: Reduced (potential)  Problems Identified Other: baby currently under photo therapy    Maternal history  Maternal history: Depression;Obesity;AMA    Breastfeeding goal  Breastfeeding goal: To maintain breast milk feeding per patient goal    Maternal Assessment  Bilateral Breasts: Pendulous;Symmetrical  Bilateral Nipples: WNL  Prior breastfeeding experience (comment below): Primip  Breastfeeding Assistance: Pumping assistance provided with permission    Pain assessment  Location/Comment: denies  Treatment of Sore Nipples: Lanolin    Guidelines for use of:  Breast pump type: Ameda Platinum;Hand Pump  Suggested use of pump: Pump 8-12X/24hr              Infant under photo  therapy. Discussed pump settings, assembly, and proper flange size.  Educated patient about supply/demand and the importance of frequent stimulation. Encouraged to call LC if assistance with breastfeeding is needed. Given hand pump and double electric, reviewed settings, and sizing looked to be good fit with 25 mm.

## 2024-06-10 NOTE — DISCHARGE INSTRUCTIONS
Call your healthcare provider right away:   -fever over 100.4  -heavy vaginal bleeding   -severe or worsening pain unrelieved by medication   -severe anxiety or depression  - Dizziness and/or changes in vision  - severe headache,   - calf pain or swelling  - Chest pain or shortness of breath.     You are on pelvic rest for 6 weeks. This means nothing in vagina (no intercourse, tampons, etc.) and no baths or swimming pool.    No heavy lifting (no more than the baby) until cleared by OB.       [facility] has great support for our families even after discharge.  We have virtual or in-person support groups.  Visit our website for the most up-to-date info for our many different support groups. [email or web address]       Outpatient Lactation appoints.  Call [phone]- to schedule an appt.  Our office is located in the Maternal Fetal Medicine office next to [facility] on the first floor.      Moms Support Groups  Our weekly New Mom Support Groups are for any new parents in our community. They are led by an experienced Mother/Baby nurse or IBCLC and usually include a guest speaker on a topic of interest to new parents. These in-person groups also include Breastfeeding Support at each meeting. Bring your baby ( - 6 months) with you! Moms-to-be are also welcome! All mom's welcome even if its not your first.     MOM & BABY HOUR   Meets most  10:00 - 11:30 a.m.  Masks are not required, but be considerate of others and do not attend if mom or baby have had any symptoms of illness within the previous 24 hours. Breastfeeding support will be included at each session--just ask the leader any breastfeeding questions you may have. Location [facility] [place] [address] [place] Go inside the front door and to the right to the “Community Education Room”.    Mom's Line: [phone]  A phone line dedicated for women (or anyone worried about a  women) who may be experiencing signs or symptoms of postpartum depression, anxiety, or overwhelmed with new baby. Call - answered by live trained mental health professionals, free and confidential, emotional support, referrals, in any language.    Nurturing Mom- A support group for new and expectant moms looking for support with the transition to parenthood as well as those experiencing symptoms of  anxiety and/or depression.  Please contact [email or web address] if you need directions or the link for the virtual meetings. Please contact [email or web address] if you plan to attend, but please be considerate of others and do not attend if mom or baby have had any symptoms of illness within the previous 24 hours.     La Leche League for breast feeding and parent support, Website: [employer]ored Fit  and for the [place] group and other groups visit https://[email or web address]/pg/Jone/events/.  to help find a group, all meetings are virtual.     Facebook groups-  for more support when home- [facility]ies & Mommies of [facility] --- you can find mom-to-mom advice and the list of speaker topics for cradle talk program.  Telephone Support  Postpartum depression [place] of [place] (www.ppdil.org)  -Free information and support for pregnant and postpartum women with symptoms of depression, anxiety  -Mom-to-mom support from volunteers who have been through depression    Telephone support: ([phone]  Urgent support:([phone])-[phone] (answered )  Email support: support@ppdil.org    Postpartum Support International (www.postpartum.net)  -Free phone conversation with trained volunteers   (549) 758-7857; after the prompt enter 64636#    National Postpartum Depression Hotline  (175)-PPD-MOMS    Helpful websites:    www.llli.org  www.Frontback  www.Breastfeed[place].org    Initiation of breast pumping after discharge:     - Add 1 pumping session a day, additional to the infant's feedings, 2-3 weeks after  delivery.     - Pump both breasts for 15 mins, immediately after a breast feeding session.    - Pumping first thing in the morning will provide greater output.    - If you chose to pump more than once a day, you should be consistent every day to prevent a breast infection.      - Pump using an electric pump over a hands free pump (electric pumps provided stronger stimulation to the nipples).    - Store the breast milk in 2-3 oz containers.     - Label containers with date and time.    - Always feed oldest milk first.

## 2024-06-10 NOTE — PAYOR COMM NOTE
--------------  ADMISSION REVIEW     Payor: [employer]/HMO/POS/EPO  Subscriber #:  [id number]  Authorization Number: [id number]    Admit date: 24  Admit time:  8:14 AM       REVIEW DOCUMENTATION:  ED Provider Notes    No notes of this type exist for this encounter.     History and Physical Examination        Subjective   Chief Complaint:  IOL-obesity/risk reducing     HISTORY OF PRESENT ILLNESS:          Patient is a 33 year old y/o   @ 39w0d weeks presents for IOL. She notes + fetal movement, - vaginal bleeding, and  - ROM. Her prenatal course was c/b obesity and preDM, had nml 3hr gtt. Her prenatal care is up to date and reviewed. Patient's GBS is Negative           Lab Results   Component Value Date     GBS Negative 2024               Past Medical History       Past Medical History:    Acne    Anxiety    Depression    Depression     Depression prior to infertility issues. Had SI but did not act on it. Got help.     Dyslipidemia    Elevated LFTs    Grief reaction     after miscarriage & ectopic.     History of ectopic pregnancy    Human papilloma virus (HPV) type 9 vaccine administered    Miscarriage (HCC)     2021 Antiphospholipid Ab testing NEGATIVE.     Morbid obesity with BMI of 45.0-49.9, adult ([facility])    Pap smear for cervical cancer screening     Pap & HPV negative.     Pneumonia due to COVID-19 virus    Prediabetes     A1c 5.7% (21)     Wears glasses            Past Surgical History   History reviewed. No pertinent surgical history.                OB History    Para Term  AB Living   3 0 0 0 2 0   SAB IAB Ectopic Multiple Live Births    1 0 1 0 0    Obstetric Comments   2021 Antiphospholipid Ab testing NEGATIVE.             Medications - Current   No current outpatient medications on file.        Allergies   No Known Allergies        Social History               Socioeconomic History    Marital status:        Spouse name: Not on file    Number of  children: Not on file    Years of education: Not on file    Highest education level: Not on file   Occupational History    Not on file   Tobacco Use    Smoking status: Never    Smokeless tobacco: Never   Vaping Use    Vaping status: Never Used   Substance and Sexual Activity    Alcohol use: Yes       Comment: socially    Drug use: Never    Sexual activity: Yes       Partners: Male       Comment: Trying to conceive   Other Topics Concern    Caffeine Concern Not Asked    Exercise Not Asked    Seat Belt Not Asked    Special Diet Not Asked    Stress Concern Not Asked    Weight Concern Not Asked   Social History Narrative     ** Merged History Encounter **            Social Determinants of Health           Financial Resource Strain: Low Risk  (6/7/2024)     Financial Resource Strain      Difficulty of Paying Living Expenses: Not very hard      Med Affordability: No   Food Insecurity: No Food Insecurity (6/7/2024)     Food Insecurity      Food Insecurity: Never true   Transportation Needs: No Transportation Needs (6/7/2024)     Transportation Needs      Lack of Transportation: No      Car Seat: Yes   Stress: Stress Concern Present (6/7/2024)     Stress      Feeling of Stress : Yes   Housing Stability: Low Risk  (6/7/2024)     Housing Stability      Housing Instability: No      Housing Instability Emergency: Not on file      Sydney or Kimberlee: Yes               Family History[]Expand by Default         Family History   Problem Relation Age of Onset    Diabetes Father      Hypertension Father      Other (bipolar) Father      Other (depression/anxiety) Father      No Known Problems Mother      Other (lupus) Maternal Grandfather      Diabetes Paternal Grandmother      Hypertension Paternal Grandmother      Prostate Cancer Paternal Grandfather 70    Prostate Cancer Maternal Uncle 45    Thyroid disease Sister      Breast Cancer Neg      Ovarian Cancer Neg      Colon Cancer Neg      Infertility Neg      Endometriosis Neg       Birth Defects Neg      Genetic Disease Neg      Blood Clotting Disorder Neg      DVT/VTE Neg      Bipolar Disorder Father      Anxiety Father      Cancer Paternal Grandfather      Cancer Maternal Uncle      Depression Paternal Aunt                            Prior to Admission medications    Medication Sig Start Date End Date Taking? Authorizing Provider   valACYclovir 500 MG Oral Tab Take 1 tablet (500 mg total) by mouth daily. 5/10/24   Yes Lb Shane MD   aspirin 81 MG Oral Chew Tab Chew by mouth daily.     Yes External/Patient, Reported   ferrous sulfate 325 (65 FE) MG Oral Tab EC Take 1 tablet (325 mg total) by mouth daily with breakfast.     Yes External/Patient, Reported   Abspyk-IoVtx-JcAxd-Meth-FA-DHA (PRENATE MINI) 18-0.6-0.4-350 MG Oral Cap Take 1 capsule by mouth daily. 10/12/23   Yes External/Patient, Reported   ergocalciferol 1.25 MG (18571 UT) Oral Cap Take 1 capsule (50,000 Units total) by mouth once a week. 9/28/23   Yes External/Patient, Reported   metRONIDAZOLE (FLAGYL) 500 MG Oral Tab Take 1 tablet (500 mg total) by mouth 2 (two) times daily. 6/7/24     Lb Shane MD   Misc. Devices (BREAST PUMP) Does not apply Misc Double electric breast pump 2/28/24     Lb Shane MD            Objective         ROS   Constitutional: Negative.  Negative for chills and fever.   Respiratory: Negative.  Negative for shortness of breath.    Cardiovascular: Negative for chest pain and palpitations.   Gastrointestinal: Negative for diarrhea, nausea and vomiting.   Genitourinary: Negative.  Negative for dysuria.   Neurological: Negative for dizziness.         Vitals:     Temp: 98.2 °F (36.8 °C)  Pulse: 65  Resp: 16  BP: 114/77      Physical Exam   Constitutional: She appears well-developed and well-nourished.   Cardiovascular: Normal rate.   Pulmonary/Chest: Effort normal.   Abdominal: Soft.   Genitourinary: Uterus normal.   Neurological: She is alert.   Skin: Skin is warm.   Psychiatric:  She has a normal mood and affect.         CE:  0/0/-3/soft     Alcantar Score: 3.     EFM:   Baseline 150, + Accels, - Decels, mod Variability     [place]:  acontractile              Lab Results   Component Value Date     ABO BLOOD TYPE O 2024     RH BLOOD TYPE Positive 2024     HGB 11.3 (L) 2024     .0 2024     HCT 34.3 (L) 2024     Rubella IgG Positive 2023     Treponemal Antibodies Nonreactive 2024     HIV Antigen Antibody Combo Non-Reactive 2024               ASSESSMENT AND PLAN:       Active Problems:    Pregnancy (HCC)           Patient admitted for IOL, start cytotec and CB   Cook catheter introduced through cervical os with stylet.  Uterine balloon inflated with 80ml normal saline.  Vaginal balloon inflated with 60mL.  Stylet removed.   EFM Cat 1.     Patient Tolerance:  Tolerated        Pain medication as desired  Good FM noted, fetal monitoring strip reviewed and reassuring.     Vaginal Delivery Note           Kasandra Rodrigues Patient Status:  Inpatient    1990 MRN [id number]   Location [facility] Attending Heide Jose MD   Hosp Day # 2 PCP Lb Shane MD      Delivery      Infant  Date of Delivery: 2024    Time of Delivery: 1:49 AM   Delivery Type: Normal spontaneous vaginal delivery      Infant Sex  Information for the patient's :  Ravi, Girl [[id number]]   female      Infant Birthweight  Information for the patient's :  Ravi, Girl [[id number]]   7 lb 3.3 oz (3.27 kg)       Presentation Vertex [1]   Position Right [3]  Occiput [1]  Anterior [1]      Apgars:  1 minute: 9                5 minutes: 9                         10 minutes:       Placenta  Date/Time of Delivery: 2024  1:59 AM    Delivery: spontaneous   Induction or Augmentation: cytotec, CB, pitocin  Placenta to Pathology: no  Cord Gases Submitted: no  Cord Blood Collection: yes  Cord Tissue Collection:  yes  Cord Complications: none  Sponge and Needle Counts:  Verified yes        Maternal Anesthesia: epidural   Episiotomy/Laceration Repair  Laceration: perineal 2nd degree     Delivery Complications  none     Neonatologist Present: no  [place] Status: Stable   Maternal Status: Stable   Delivery Comment:  with 2nd degree lac repaired with 2-0 vicryl, manual extraction of placenta due to cord avulsion. excellent hemostasis         Intake/Output   QBL:  See nursing documentation below           Heide Jose MD, MD  2024  2:16 AM        Ravi, Girl [[id number]]       Labor Events     labor?: No   steroids?: None  Antibiotics received during labor?: No  Rupture date/time: 2024 1130     Rupture type: AROM  Fluid color: Meconium  Labor type: Induced Onset of Labor  Induction: Misoprostol, Cervical Ripening Balloon, Oxytocin, AROM  Indications for induction: Other - comment  Induction comment: High BMI  Intrapartum & labor complications: Meconium         Labor Event Times    Labor onset date/time: 2024 1200  Dilation complete date/time: 2024 0042  Start pushing date/time: 2024 0133          Presentation    Presentation: Vertex  Position: Right Occiput Anterior         Operative Delivery    Operative Vaginal Delivery: No                          Shoulder Dystocia    Shoulder Dystocia: No         Anesthesia    Method: Epidural                    [place] Delivery            Head delivery date/time: 2024 01:49:26   Delivery date/time:  24 01:49:30   Delivery type: Normal spontaneous vaginal delivery     Details:      Delivery location: delivery room  Delivery Room Temperature: 73         Delivery Providers    Delivering Clinician: Heide Jose MD    Delivery personnel:  Provider Role   Neena Spivey, RN Baby Nurse   Stephanie Florez RN Delivery Nurse   Emma Hagen Surgical Tech                Cord    Vessels: 3 Vessels  Complications: None  Timed  cord clamping: Yes  Time in sec: 30  Cord blood disposition: to lab  Gases sent?: No         Resuscitation    Method: None          Measurements       Weight: 3270 g 7 lb 3.3 oz Length: 52.1 cm      Head circum.: 34.5 cm                   Placenta    Date/time: 2024 0159  Removal: Manual Removal  Appearance: Intact  Disposition: Discarded         Apgars    Living status: Living    Apgar Scoring Key:    0 1 2     Skin color Blue or pale Acrocyanotic Completely pink     Heart rate Absent <100 bpm >100 bpm     Reflex irritability No response Grimace Cry or active withdrawal     Muscle tone Limp Some flexion Active motion     Respiratory effort Absent Weak cry; hypoventilation Good, crying                1 Minute:  5 Minute:  10 Minute:  15 Minute:  20 Minute:       Skin color: 1  1          Heart rate: 2  2          Reflex irritablity: 2  2          Muscle tone: 2  2          Respiratory effort: 2  2          Total: 9  9             Apgars assigned by: ANDERSON CASTRO RN  [place] disposition: with mother         Skin to Skin    Skin to skin initiated date/time: 2024 0203  Skin to skin with: Mother         Vaginal Count    Initial count RN: Colin Escobedo RN  Initial count Tech: Volobuyev, Ilona    Sponges     Sharps     Initial counts 10     0     Final counts             Final count RN: Stephanie Florez RN  Final count MD: Heide Jose MD         Lacerations    Episiotomy: None  Perineal lacerations: 2nd Repaired?: Yes      Vaginal laceration?: No        Cervical laceration?: No     Clitoral laceration?: No            OB/Gyn PROGRESS NOTE   Assessment and Plan:    Active Problems:    Normal delivery at term (HCC)           PPD # 0  Cont PP care.  Ambulate.  #hx of depression: coping well.  - sw consult  6/10  General Surgery PROGRESS NOTE   Assessment and Plan:    Principal Problem:    Normal delivery at term ([facility])  Active Problems:    Major depressive disorder, single episode, severe without psychosis  (HCC)    Anxiety disorder, unspecified           PPD # 1  Cont PP care.  Ambulate.  Baby under light therapy   No evidence of depressive issues        MEDICATIONS ADMINISTERED IN LAST 1 DAY:  benzocaine-menthol (Dermoplast) 20-0.5 % topical spray 1 spray       Date Action Dose Route User    6/9/2024 1750 Given 1 spray Topical Kayla Price RN          docusate sodium (Colace) cap 100 mg       Date Action Dose Route User    6/10/2024 0822 Given 100 mg Oral Gema Portlilo RN    6/9/2024 1645 Given 100 mg Oral Kayla Price RN          ibuprofen (Motrin) tab 600 mg       Date Action Dose Route User    6/10/2024 0822 Given 600 mg Oral Gema Portillo RN    6/9/2024 2212 Given 600 mg Oral Tricia Macias RN    6/9/2024 1645 Given 600 mg Oral Kayla Price RN          prenatal vitamin with DHA (PNV with DHA) cap 1 capsule       Date Action Dose Route User    6/10/2024 0822 Given 1 capsule Oral Gema Portillo RN            Vitals (last day)       Date/Time Temp Pulse Resp BP SpO2 Weight O2 Device O2 Flow Rate (L/min) Who    06/09/24 0145 -- 73 -- 88/68 100 % -- -- -- DG          CIWA Scores (since admission)       None          PLEASE FAX DAYS CERTIFIED AND NEXT REVIEW DATE [phone]

## 2024-06-10 NOTE — PLAN OF CARE
Problem: POSTPARTUM  Goal: Long Term Goal:Experiences normal postpartum course  Description: INTERVENTIONS:  - Assess and monitor vital signs and lab values.  - Assess fundus and lochia.  - Provide ice/sitz baths for perineum discomfort.  - Monitor healing of incision/episiotomy/laceration, and assess for signs and symptoms of infection and hematoma.  - Assess bladder function and monitor for bladder distention.  - Provide/instruct/assist with pericare as needed.  - Provide VTE prophylaxis as needed.  - Monitor bowel function.  - Encourage ambulation and provide assistance as needed.  - Assess and monitor emotional status and provide social service/psych resources as needed.  - Utilize standard precautions and use personal protective equipment as indicated. Ensure aseptic care of all intravenous lines and invasive tubes/drains.  - Obtain immunization and exposure to communicable diseases history.  Outcome: Progressing  Goal: Optimize infant feeding at the breast  Description: INTERVENTIONS:  - Initiate breast feeding within first hour after birth.   - Monitor effectiveness of current breast feeding efforts.  - Assess support systems available to mother/family.  - Identify cultural beliefs/practices regarding lactation, letdown techniques, maternal food preferences.  - Assess mother's knowledge and previous experience with breast feeding.  - Provide information as needed about early infant feeding cues (e.g., rooting, lip smacking, sucking fingers/hand) versus late cue of crying.  - Discuss/demonstrate breast feeding aids (e.g., infant sling, nursing footstool/pillows, and breast pumps).  - Encourage mother/other family members to express feelings/concerns, and actively listen.  - Educate father/SO about benefits of breast feeding and how to manage common lactation challenges.  - Recommend avoidance of specific medications or substances incompatible with breast feeding.  - Assess and monitor for signs of nipple  pain/trauma.  - Instruct and provide assistance with proper latch.  - Review techniques for milk expression (breast pumping) and storage of breast milk. Provide pumping equipment/supplies, instructions and assistance, as needed.  - Encourage rooming-in and breast feeding on demand.  - Encourage skin-to-skin contact.  - Provide LC support as needed.  - Assess for and manage engorgement.  - Provide breast feeding education handouts and information on community breast feeding support.   Outcome: Progressing  Goal: Establishment of adequate milk supply with medication/procedure interruptions  Description: INTERVENTIONS:  - Review techniques for milk expression (breast pumping).   - Provide pumping equipment/supplies, instructions, and assistance until it is safe to breastfeed infant.  Outcome: Progressing  Goal: Experiences normal breast weaning course  Description: INTERVENTIONS:  - Assess for and manage engorgement.  - Instruct on breast care.  - Provide comfort measures.  Outcome: Progressing  Goal: Appropriate maternal -  bonding  Description: INTERVENTIONS:  - Assess caregiver- interactions.  - Assess caregiver's emotional status and coping mechanisms.  - Encourage caregiver to participate in  daily care.  - Assess support systems available to mother/family.  - Provide /case management support as needed.  Outcome: Progressing     Problem: SKIN/TISSUE INTEGRITY - ADULT  Goal: Skin integrity remains intact  Description: INTERVENTIONS  - Assess and document risk factors for pressure ulcer development  - Assess and document skin integrity  - Monitor for areas of redness and/or skin breakdown  - Initiate interventions, skin care algorithm/standards of care as needed  Outcome: Progressing  Goal: Incision(s), wounds(s) or drain site(s) healing without S/S of infection  Description: INTERVENTIONS:  - Assess and document risk factors for pressure ulcer development  - Assess and document  skin integrity  - Assess and document dressing/incision, wound bed, drain sites and surrounding tissue  - Implement wound care per orders  - Initiate isolation precautions as appropriate  - Initiate Pressure Ulcer prevention bundle as indicated  Outcome: Progressing  Goal: Oral mucous membranes remain intact  Description: INTERVENTIONS  - Assess oral mucosa and hygiene practices  - Implement preventative oral hygiene regimen  - Implement oral medicated treatments as ordered  Outcome: Progressing     Problem: Patient/Family Goals  Goal: Patient/Family Long Term Goal  Description: Patient's Long Term Goal:     Interventions:  -   - See additional Care Plan goals for specific interventions  Outcome: Progressing  Goal: Patient/Family Short Term Goal  Description: Patient's Short Term Goal:     Interventions:   -   - See additional Care Plan goals for specific interventions  Outcome: Progressing     Problem: Patient Centered Care  Goal: Patient preferences are identified and integrated in the patient's plan of care  Description: Interventions:  - What would you like us to know as we care for you?   - Provide timely, complete, and accurate information to patient/family  - Incorporate patient and family knowledge, values, beliefs, and cultural backgrounds into the planning and delivery of care  - Encourage patient/family to participate in care and decision-making at the level they choose  - Honor patient and family perspectives and choices  Outcome: Progressing     Problem: ANXIETY  Goal: Will report anxiety at manageable levels  Description: INTERVENTIONS:  - Administer medication as ordered  - Teach and rehearse alternative coping skills  - Provide emotional support with 1:1 interaction with staff  Outcome: Progressing     Problem: PAIN - ADULT  Goal: Verbalizes/displays adequate comfort level or patient's stated pain goal  Description: INTERVENTIONS:  - Encourage pt to monitor pain and request assistance  - Assess pain  using appropriate pain scale  - Administer analgesics based on type and severity of pain and evaluate response  - Implement non-pharmacological measures as appropriate and evaluate response  - Consider cultural and social influences on pain and pain management  - Manage/alleviate anxiety  - Utilize distraction and/or relaxation techniques  - Monitor for opioid side effects  - Notify MD/LIP if interventions unsuccessful or patient reports new pain  - Anticipate increased pain with activity and pre-medicate as appropriate  Outcome: Progressing

## 2024-06-10 NOTE — PROGRESS NOTES
PROGRESS NOTE        Date:     6/10/2024    Patient: Kasandra Rodrigues       :    1990  Age:     33 year old      Gender: female  MRN:  [id number]    Subjective :  The patient is 33 year old y/o  Postpartum day #1 from a vaginal delivery.  She is doing well.  Lochia normal.  Pain controlled.  Breastfeeding without difficulty.        Objective   Physical Exam:  BP 98/65 (BP Location: Right arm)   Pulse 69   Temp 97.8 °F (36.6 °C) (Oral)   Resp 15   Wt 273 lb 12.8 oz (124.2 kg)   LMP 2023 (Exact Date)   SpO2 100%   Breastfeeding Yes   BMI 44.19 kg/m²     Intake/Output Summary (Last 24 hours) at 6/10/2024 0949  Last data filed at 2024 1425  Gross per 24 hour   Intake --   Output 490 ml   Net -490 ml     Abdomen - soft, ND, NT  Fundus -firm, NT  Lower Extremities - NT    Result Data:  Lab Results   Component Value Date    WBC 9.8 2024    RBC 4.54 2024    HGB 11.3 (L) 2024    HCT 34.3 (L) 2024    .0 2024    GLU 95 2021    BUN 9 2021     2021    K 4.0 2021     2021    CA 9.2 2021    CO2 27 2021     Abnormal Labs Reviewed   CBC W/ DIFFERENTIAL - Abnormal; Notable for the following components:       Result Value    HGB 11.3 (*)     HCT 34.3 (*)     MCV 75.6 (*)     MCH 24.9 (*)     RDW 16.2 (*)     Neutrophil Absolute Prelim 7.72 (*)     Neutrophil Absolute 7.72 (*)     All other components within normal limits             Assessment and Plan:    Principal Problem:    Normal delivery at term ([facility])  Active Problems:    Major depressive disorder, single episode, severe without psychosis ([facility])    Anxiety disorder, unspecified        PPD # 1  Cont PP care.  Ambulate.  Baby under light therapy   No evidence of depressive issues     LANNY Bass-S       Patient seen and examined, Agree with assessment and plan of care documented by PA student.     Oly Koch MD

## 2024-06-11 ENCOUNTER — TELEPHONE (OUTPATIENT)
Dept: OBGYN CLINIC | Facility: CLINIC | Age: 34
End: 2024-06-11

## 2024-06-11 VITALS
SYSTOLIC BLOOD PRESSURE: 102 MMHG | WEIGHT: 273.81 LBS | RESPIRATION RATE: 15 BRPM | BODY MASS INDEX: 44 KG/M2 | DIASTOLIC BLOOD PRESSURE: 63 MMHG | TEMPERATURE: 98 F | HEART RATE: 61 BPM | OXYGEN SATURATION: 100 %

## 2024-06-11 RX ORDER — PSEUDOEPHEDRINE HCL 30 MG
100 TABLET ORAL
Qty: 30 CAPSULE | Refills: 1 | Status: SHIPPED | OUTPATIENT
Start: 2024-06-11

## 2024-06-11 RX ORDER — IBUPROFEN 600 MG/1
600 TABLET ORAL EVERY 6 HOURS
Qty: 16 TABLET | Refills: 0 | Status: SHIPPED | OUTPATIENT
Start: 2024-06-11

## 2024-06-11 RX ORDER — MAGNESIUM OXIDE 400 MG (241.3 MG MAGNESIUM) TABLET
325 TABLET EVERY OTHER DAY
Qty: 30 TABLET | Refills: 1 | Status: SHIPPED | OUTPATIENT
Start: 2024-06-11

## 2024-06-11 NOTE — PROGRESS NOTES
[facility]  part of [facility]    OB/GYNE Progress Note      Kasandra Rodrigues Patient Status:  Inpatient    1990 MRN [id number]   Location [facility] 3SE Attending Heide Jose MD   Hosp Day # 4 PCP Lb Shane MD          Subjective     No c/o    Review of Systems:  Constitutional: feeling well        Objective   Vital signs in last 24 hours:  Temp:  [97.7 °F (36.5 °C)-98.1 °F (36.7 °C)] 98.1 °F (36.7 °C)  Pulse:  [61-75] 61  Resp:  [15-18] 15  BP: (102-114)/(63-67) 102/63    Input/Output:  No intake or output data in the 24 hours ending 24 1028    Weight (Last 6):  Wt Readings from Last 6 Encounters:   24 273 lb 12.8 oz (124.2 kg)   24 273 lb 12.8 oz (124.2 kg)   24 272 lb (123.4 kg)   24 273 lb (123.8 kg)   24 273 lb (123.8 kg)   05/10/24 272 lb (123.4 kg)     Body mass index is 44.19 kg/m².     Exam:   Constitutional: comfortable  Uterus: fundus firm and fundus below umbilicus  Extremities: No edema  Negative Kody's sign.          Results:     Lab Results   Component Value Date    TREPONEMALAB Nonreactive 2024    RAPIDHIVSCRN Nonreactive 2024    ABO O 2024    RH Positive 2024    WBC 9.8 2024    HGB 11.3 (L) 2024    HCT 34.3 (L) 2024    .0 2024    CREATSERUM 0.66 2021    BUN 9 2021     2021    K 4.0 2021     2021    CO2 27 2021    GLU 95 2021    CA 9.2 2021    ALB 4.2 2021    ALKPHO 94 2021    BILT 0.7 2021    TP 7.6 2021    AST 36 (H) 2021    ALT 74 (H) 2021    TSH 1.581 2023    GGT 46 2018       Lab Results   Component Value Date    ETOH <3 2018    COLORUR Light-Yellow 2023    CLARITY Clear 2023    SPECGRAVITY 1.010 2024    PROUR Negative 2023    GLUUR Normal 2023    KETUR Negative 2023    BILUR Negative 2023     BLOODURINE Negative 12/08/2023    NITRITE Negative 05/31/2024    UROBILINOGEN Normal 12/08/2023    LEUUR 250 (A) 12/08/2023       No results found.      Assessment/Plan     Normal delivery at term ([facility])  pp2      Major depressive disorder, single episode, severe without psychosis (HCC)  home      Anxiety disorder, unspecified      Denies depression sx's          Discussed with: patient     Plan discussed with patient who verbalizes understanding and agreement.          Won Gillis MD  6/11/2024  10:28 AM

## 2024-06-11 NOTE — TELEPHONE ENCOUNTER
Family Medical Leave Act and [employer] For Duty forms received via [employer]. Logged for processing. Sent [employer] message.

## 2024-06-11 NOTE — DISCHARGE SUMMARY
[facility]  part of [facility]    Discharge Summary    Kasandra Rodrigues Patient Status:  Inpatient    1990 MRN [id number]   Location [facility] 3SE Attending Heide Galvan MD   Hosp Day # 4 PCP Lb Shane MD     Date of Admission: 2024    Admission Diagnoses: Pregnancy (HCC)     Date of Discharge: 24     Discharge Diagnoses:S/P Vaginal Delivery    Episode Diagnoses:   NOB Problems (from 10/31/23 to present)       No problems associated with this episode.                  Hospital Course:     EDC: Estimated Date of Delivery: 24    Gestational Age: 39w2d    Date of Delivery: 2024   Time of Delivery: 1:49 AM     Antepartum complications: none    Delivered By: HEIDE GALVAN     Delivery Method: Normal spontaneous vaginal delivery     Delivery Procedures:     Baby: female       Apgars:  1 minute:   9                  5 minutes: 9                           10 minutes:      Feeding Method:The patient is currently breastfeeding.     Intrapartum Complications: None    Lacerations      Perineal lacerations: 2nd Repaired?: Yes     Vaginal laceration?: No      Cervical laceration?: No    Clitoral laceration?: No             Episiotomy: None     Placenta: Manual Removal     Postpartum complications: None                  Discharge Plan:   Discharge Condition: Stable    Discharge medications:  Current Discharge Medication List        New Orders    Details   docusate sodium 100 MG Oral Cap Take 100 mg by mouth daily as needed for constipation.      ibuprofen 600 MG Oral Tab Take 1 tablet (600 mg total) by mouth every 6 (six) hours.           Home Meds - Modified    Details   ferrous sulfate 325 (65 FE) MG Oral Tab EC Take 1 tablet (325 mg total) by mouth every other day.           Home Meds - Unchanged    Details   Ljbotk-KrJuc-ScJcj-Meth-FA-DHA (PRENATE MINI) 18-0.6-0.4-350 MG Oral Cap Take 1 capsule by mouth daily.      Misc. Devices (BREAST PUMP)  Does not apply Misc Double electric breast pump                   Discharge Diet: As tolerated    Discharge Activity: Pelvic rest until cleared    Follow up:     Follow Up in the Office: 3 wks     Follow-up Information       [facility] Lactation Services. Call.    Specialty: Pediatrics  Why: As needed  Contact information:  [address] [facility] [address]  [phone]  Additional information:  Masks are optional for all patients and visitors, unless otherwise indicated.             Heide Jose MD Follow up in 3 week(s).    Specialty: OBSTETRICS & GYNECOLOGY  Contact information:  [address] [facility] [id number] [phone]                                   Other Discharge Instructions:         [facility] has great support for our families even after discharge.  We have virtual or in-person support groups.  Visit our website for the most up-to-date info for our many different support groups. [email or web address]/services/[email or web address]/resources/       Outpatient Lactation appoints.  Call [phone] to schedule an appt.  Our office is located in the Maternal Fetal Medicine office next to [facility] on the first floor.      Moms Support Groups  Our weekly New Mom Support Groups are for any new parents in our community. They are led by an experienced Mother/Baby nurse or IBCLC and usually include a guest speaker on a topic of interest to new parents. These in-person groups also include Breastfeeding Support at each meeting. Bring your baby ( - 6 months) with you! Moms-to-be are also welcome! All mom's welcome even if its not your first.     MOM & BABY HOUR   Meets most  10:00 - 11:30 a.m.  Masks are not required, but be considerate of others and do not attend if mom or baby have had any symptoms of illness within the previous 24 hours. Breastfeeding support will be included at each session--just ask the leader any breastfeeding questions you may  have. Location [facility] [place] [address] [facility], [place] Go inside the front door and to the right to the “Community Education Room”.    Mom's Line: [phone]  A phone line dedicated for women (or anyone worried about a women) who may be experiencing signs or symptoms of postpartum depression, anxiety, or overwhelmed with new baby. Call - answered by live trained mental health professionals, free and confidential, emotional support, referrals, in any language.    Nurturing Mom- A support group for new and expectant moms looking for support with the transition to parenthood as well as those experiencing symptoms of  anxiety and/or depression.  Please contact @[email or web address] if you need directions or the link for the virtual meetings. Please contact [email or web address] if you plan to attend, but please be considerate of others and do not attend if mom or baby have had any symptoms of illness within the previous 24 hours.     La Leche League for breast feeding and parent support, Website: Spockly.[employer]  and for the [place] group and other groups visit [email or web address].[email or web address]/pg/Jone/events/.  to help find a group, all meetings are virtual.     Facebook groups-  for more support when home- Babies & Mommies of [facility] --- you can find mom-to-mom advice and the list of speaker topics for cradle talk program.  Telephone Support  Postpartum depression [place] of [place] (www.ppdil.org)  -Free information and support for pregnant and postpartum women with symptoms of depression, anxiety  -Mom-to-mom support from volunteers who have been through depression    Telephone support: ([phone]  Urgent support:([phone])-[phone] (answered )  Email support: support@ppdil.org    Postpartum Support International (www.postpartum.net)  -Free phone conversation with trained volunteers   (589) 402-3766; after the prompt enter 21120#    National Postpartum Depression  Hotline  (874)-PPD-MOMS    Helpful websites:    www.llli.org  www.Playmatics.WibiData  www.Breastfeed[place].org    Initiation of breast pumping after discharge:     - Add 1 pumping session a day, additional to the infant's feedings, 2-3 weeks after delivery.     - Pump both breasts for 15 mins, immediately after a breast feeding session.    - Pumping first thing in the morning will provide greater output.    - If you chose to pump more than once a day, you should be consistent every day to prevent a breast infection.      - Pump using an electric pump over a hands free pump (electric pumps provided stronger stimulation to the nipples).    - Store the breast milk in 2-3 oz containers.     - Label containers with date and time.    - Always feed oldest milk first.                           Won Gillis MD  6/11/2024

## 2024-06-11 NOTE — PLAN OF CARE
Problem: PAIN - ADULT  Goal: Verbalizes/displays adequate comfort level or patient's stated pain goal  Description: INTERVENTIONS:  - Encourage pt to monitor pain and request assistance  - Assess pain using appropriate pain scale  - Administer analgesics based on type and severity of pain and evaluate response  - Implement non-pharmacological measures as appropriate and evaluate response  - Consider cultural and social influences on pain and pain management  - Manage/alleviate anxiety  - Utilize distraction and/or relaxation techniques  - Monitor for opioid side effects  - Notify MD/LIP if interventions unsuccessful or patient reports new pain  - Anticipate increased pain with activity and pre-medicate as appropriate  Outcome: Completed     Problem: ANXIETY  Goal: Will report anxiety at manageable levels  Description: INTERVENTIONS:  - Administer medication as ordered  - Teach and rehearse alternative coping skills  - Provide emotional support with 1:1 interaction with staff  Outcome: Completed     Problem: Patient Centered Care  Goal: Patient preferences are identified and integrated in the patient's plan of care  Description: Interventions:  - What would you like us to know as we care for you?   - Provide timely, complete, and accurate information to patient/family  - Incorporate patient and family knowledge, values, beliefs, and cultural backgrounds into the planning and delivery of care  - Encourage patient/family to participate in care and decision-making at the level they choose  - Honor patient and family perspectives and choices  Outcome: Completed     Problem: Patient/Family Goals  Goal: Patient/Family Long Term Goal  Description: Patient's Long Term Goal:     Interventions:  -   - See additional Care Plan goals for specific interventions  Outcome: Completed  Goal: Patient/Family Short Term Goal  Description: Patient's Short Term Goal:     Interventions:   -   - See additional Care Plan goals for specific  interventions  Outcome: Completed     Problem: SKIN/TISSUE INTEGRITY - ADULT  Goal: Skin integrity remains intact  Description: INTERVENTIONS  - Assess and document risk factors for pressure ulcer development  - Assess and document skin integrity  - Monitor for areas of redness and/or skin breakdown  - Initiate interventions, skin care algorithm/standards of care as needed  Outcome: Completed  Goal: Incision(s), wounds(s) or drain site(s) healing without S/S of infection  Description: INTERVENTIONS:  - Assess and document risk factors for pressure ulcer development  - Assess and document skin integrity  - Assess and document dressing/incision, wound bed, drain sites and surrounding tissue  - Implement wound care per orders  - Initiate isolation precautions as appropriate  - Initiate Pressure Ulcer prevention bundle as indicated  Outcome: Completed  Goal: Oral mucous membranes remain intact  Description: INTERVENTIONS  - Assess oral mucosa and hygiene practices  - Implement preventative oral hygiene regimen  - Implement oral medicated treatments as ordered  Outcome: Completed     Problem: POSTPARTUM  Goal: Long Term Goal:Experiences normal postpartum course  Description: INTERVENTIONS:  - Assess and monitor vital signs and lab values.  - Assess fundus and lochia.  - Provide ice/sitz baths for perineum discomfort.  - Monitor healing of incision/episiotomy/laceration, and assess for signs and symptoms of infection and hematoma.  - Assess bladder function and monitor for bladder distention.  - Provide/instruct/assist with pericare as needed.  - Provide VTE prophylaxis as needed.  - Monitor bowel function.  - Encourage ambulation and provide assistance as needed.  - Assess and monitor emotional status and provide social service/psych resources as needed.  - Utilize standard precautions and use personal protective equipment as indicated. Ensure aseptic care of all intravenous lines and invasive tubes/drains.  - Obtain  immunization and exposure to communicable diseases history.  Outcome: Completed  Goal: Optimize infant feeding at the breast  Description: INTERVENTIONS:  - Initiate breast feeding within first hour after birth.   - Monitor effectiveness of current breast feeding efforts.  - Assess support systems available to mother/family.  - Identify cultural beliefs/practices regarding lactation, letdown techniques, maternal food preferences.  - Assess mother's knowledge and previous experience with breast feeding.  - Provide information as needed about early infant feeding cues (e.g., rooting, lip smacking, sucking fingers/hand) versus late cue of crying.  - Discuss/demonstrate breast feeding aids (e.g., infant sling, nursing footstool/pillows, and breast pumps).  - Encourage mother/other family members to express feelings/concerns, and actively listen.  - Educate father/SO about benefits of breast feeding and how to manage common lactation challenges.  - Recommend avoidance of specific medications or substances incompatible with breast feeding.  - Assess and monitor for signs of nipple pain/trauma.  - Instruct and provide assistance with proper latch.  - Review techniques for milk expression (breast pumping) and storage of breast milk. Provide pumping equipment/supplies, instructions and assistance, as needed.  - Encourage rooming-in and breast feeding on demand.  - Encourage skin-to-skin contact.  - Provide LC support as needed.  - Assess for and manage engorgement.  - Provide breast feeding education handouts and information on community breast feeding support.   Outcome: Completed  Goal: Establishment of adequate milk supply with medication/procedure interruptions  Description: INTERVENTIONS:  - Review techniques for milk expression (breast pumping).   - Provide pumping equipment/supplies, instructions, and assistance until it is safe to breastfeed infant.  Outcome: Completed  Goal: Experiences normal breast weaning  course  Description: INTERVENTIONS:  - Assess for and manage engorgement.  - Instruct on breast care.  - Provide comfort measures.  Outcome: Completed  Goal: Appropriate maternal -  bonding  Description: INTERVENTIONS:  - Assess caregiver- interactions.  - Assess caregiver's emotional status and coping mechanisms.  - Encourage caregiver to participate in  daily care.  - Assess support systems available to mother/family.  - Provide /case management support as needed.  Outcome: Completed

## 2024-06-11 NOTE — PLAN OF CARE
Problem: PAIN - ADULT  Goal: Verbalizes/displays adequate comfort level or patient's stated pain goal  Description: INTERVENTIONS:  - Encourage pt to monitor pain and request assistance  - Assess pain using appropriate pain scale  - Administer analgesics based on type and severity of pain and evaluate response  - Implement non-pharmacological measures as appropriate and evaluate response  - Consider cultural and social influences on pain and pain management  - Manage/alleviate anxiety  - Utilize distraction and/or relaxation techniques  - Monitor for opioid side effects  - Notify MD/LIP if interventions unsuccessful or patient reports new pain  - Anticipate increased pain with activity and pre-medicate as appropriate  Outcome: Progressing     Problem: ANXIETY  Goal: Will report anxiety at manageable levels  Description: INTERVENTIONS:  - Administer medication as ordered  - Teach and rehearse alternative coping skills  - Provide emotional support with 1:1 interaction with staff  Outcome: Progressing     Problem: POSTPARTUM  Goal: Long Term Goal:Experiences normal postpartum course  Description: INTERVENTIONS:  - Assess and monitor vital signs and lab values.  - Assess fundus and lochia.  - Provide ice/sitz baths for perineum discomfort.  - Monitor healing of incision/episiotomy/laceration, and assess for signs and symptoms of infection and hematoma.  - Assess bladder function and monitor for bladder distention.  - Provide/instruct/assist with pericare as needed.  - Provide VTE prophylaxis as needed.  - Monitor bowel function.  - Encourage ambulation and provide assistance as needed.  - Assess and monitor emotional status and provide social service/psych resources as needed.  - Utilize standard precautions and use personal protective equipment as indicated. Ensure aseptic care of all intravenous lines and invasive tubes/drains.  - Obtain immunization and exposure to communicable diseases history.  Outcome:  Progressing  Goal: Appropriate maternal -  bonding  Description: INTERVENTIONS:  - Assess caregiver- interactions.  - Assess caregiver's emotional status and coping mechanisms.  - Encourage caregiver to participate in  daily care.  - Assess support systems available to mother/family.  - Provide /case management support as needed.  Outcome: Progressing

## 2024-06-12 NOTE — PAYOR COMM NOTE
--------------  DISCHARGE REVIEW    Payor: [employer] [facility]/HMO/POS/EPO  Subscriber #:  [id number]  Authorization Number: [id number]    Admit date: 24  Admit time:   8:14 AM  Discharge Date: 2024  1:02 PM     Admitting Physician: Leonard Galvan MD  Attending Physician:  No att. providers found  Primary Care Physician: Lb Shane MD          Discharge Summary Notes        Discharge Summary signed by Won Gillis MD at 2024 10:31 AM       Author: Won Gillis MD Specialty: OBSTETRICS & GYNECOLOGY Author Type: Physician    Filed: 2024 10:31 AM Date of Service: 2024 10:31 AM Status: Signed    : Won Gillis MD (Physician)           [facility]  part of [facility]    Discharge Summary    Kasandra Rodrigues Patient Status:  Inpatient    1990 MRN [id number]   Location [facility] 3SE Attending Leonard Galvan MD   Hosp Day # 4 PCP Lb Shane MD     Date of Admission: 2024    Admission Diagnoses: Pregnancy (HCC)     Date of Discharge: 24     Discharge Diagnoses:S/P Vaginal Delivery    Episode Diagnoses:   NOB Problems (from 10/31/23 to present)       No problems associated with this episode.                  Hospital Course:     EDC: Estimated Date of Delivery: 24    Gestational Age: 39w2d    Date of Delivery: 2024   Time of Delivery: 1:49 AM     Antepartum complications: none    Delivered By: LEONARD GALVAN     Delivery Method: Normal spontaneous vaginal delivery     Delivery Procedures:     Baby: female       Apgars:  1 minute:   9                  5 minutes: 9                           10 minutes:      Feeding Method:The patient is currently breastfeeding.     Intrapartum Complications: None    Lacerations      Perineal lacerations: 2nd Repaired?: Yes     Vaginal laceration?: No      Cervical laceration?: No    Clitoral laceration?: No             Episiotomy: None     Placenta: Manual  Removal     Postpartum complications: None                  Discharge Plan:   Discharge Condition: Stable    Discharge medications:  Current Discharge Medication List        New Orders    Details   docusate sodium 100 MG Oral Cap Take 100 mg by mouth daily as needed for constipation.      ibuprofen 600 MG Oral Tab Take 1 tablet (600 mg total) by mouth every 6 (six) hours.           Home Meds - Modified    Details   ferrous sulfate 325 (65 FE) MG Oral Tab EC Take 1 tablet (325 mg total) by mouth every other day.           Home Meds - Unchanged    Details   Mxqlax-PkSfv-RhHsi-Meth-FA-DHA (PRENATE MINI) 18-0.6-0.4-350 MG Oral Cap Take 1 capsule by mouth daily.      Misc. Devices (BREAST PUMP) Does not apply Misc Double electric breast pump                   Discharge Diet: As tolerated    Discharge Activity: Pelvic rest until cleared    Follow up:     Follow Up in the Office: 3 wks     Follow-up Information       [facility] Lactation Services. Call.    Specialty: Pediatrics  Why: As needed  Contact information:  [address] [facility] [id number] [phone]  Additional information:  Masks are optional for all patients and visitors, unless otherwise indicated.             Heide Jose MD Follow up in 3 week(s).    Specialty: OBSTETRICS & GYNECOLOGY  Contact information:  [address] [facility] [id number] [phone]                                   Other Discharge Instructions:         [facility] has great support for our families even after discharge.  We have virtual or in-person support groups.  Visit our website for the most up-to-date info for our many different support groups. https://www.[facility].org/services/pregnancy-baby/resources/       Outpatient Lactation appoints.  Call [phone]- to schedule an appt.  Our office is located in the Maternal Fetal Medicine office next to [facility] on the first floor.      Moms Support Groups  Our weekly New Mom Support Groups  are for any new parents in our community. They are led by an experienced Mother/Baby nurse or IBCLC and usually include a guest speaker on a topic of interest to new parents. These in-person groups also include Breastfeeding Support at each meeting. Bring your baby ( - 6 months) with you! Moms-to-be are also welcome! All mom's welcome even if its not your first.     MOM & BABY HOUR   Meets most  10:00 - 11:30 a.m.  Masks are not required, but be considerate of others and do not attend if mom or baby have had any symptoms of illness within the previous 24 hours. Breastfeeding support will be included at each session--just ask the leader any breastfeeding questions you may have. Location [facility] [place] [address] [place] Go inside the front door and to the right to the “Community Education Room”.    Mom's Line: [phone]  A phone line dedicated for women (or anyone worried about a women) who may be experiencing signs or symptoms of postpartum depression, anxiety, or overwhelmed with new baby. Call - answered by live trained mental health professionals, free and confidential, emotional support, referrals, in any language.    Nurturing Mom- A support group for new and expectant moms looking for support with the transition to parenthood as well as those experiencing symptoms of  anxiety and/or depression.  Please contact @[email or web address] if you need directions or the link for the virtual meetings. Please contact @[email or web address] if you plan to attend, but please be considerate of others and do not attend if mom or baby have had any symptoms of illness within the previous 24 hours.     La Leche League for breast feeding and parent support, Website: IIIus.org  and for the [place] group and other groups visit https://www.facebook.com/pg/Jone/events/.  to help find a group, all meetings are virtual.     Facebook groups-  for more  support when home- Babies & Mommies of [facility] --- you can find mom-to-mom advice and the list of speaker topics for cradle talk program.  Telephone Support  Postpartum depression [place] of [place] (www.ppdil.org)  -Free information and support for pregnant and postpartum women with symptoms of depression, anxiety  -Mom-to-mom support from volunteers who have been through depression    Telephone support: [phone]  Urgent support:([phone] (answered 24/7)  Email support: support@ppdil.org    Postpartum Support International (www.postpartum.net)  -Free phone conversation with trained volunteers   ([phone] 175-9519; after the prompt enter 69771#    National Postpartum Depression Hotline  (699)-PPD-MOMS    Helpful websites:    www.llli.[employer]rapeutics  www.Scandit  www.Breastfeed[place].org    Initiation of breast pumping after discharge:     - Add 1 pumping session a day, additional to the infant's feedings, 2-3 weeks after delivery.     - Pump both breasts for 15 mins, immediately after a breast feeding session.    - Pumping first thing in the morning will provide greater output.    - If you chose to pump more than once a day, you should be consistent every day to prevent a breast infection.      - Pump using an electric pump over a hands free pump (electric pumps provided stronger stimulation to the nipples).    - Store the breast milk in 2-3 oz containers.     - Label containers with date and time.    - Always feed oldest milk first.                           Won Gillis MD  6/11/2024    Electronically signed by Won Gillis MD on 6/11/2024 10:31 AM         REVIEWER COMMENTS

## 2024-06-13 NOTE — TELEPHONE ENCOUNTER
Please try to avoid signing forms in the corner as it is not visible when printing and forms are not accepted this way. Thank you!                                                                        2 FORMS NEED SIGNATURE !    Dr. Shane     *The ACKNOWLEDGE button has been moved to the top right ribbon*    Please sign off on form if you agree to: Family Medical Leave Act   Start: 6/7/24  End: 7/19/24  Return to work: 7/22/24  AND   [employer]  (place your signature on the first page only)    -From your Inbasket, Highlight the patient and click Chart   -Double click the 6/11/24 Forms Completion telephone encounter  -Scroll down to the Media section   -Click the blue Hyperlink: Family Medical Leave Act Dr. Shane 6/13/24  [employer]  Dr. Shane 6/13/24  -Click Acknowledge located in the top right ribbon/menu   -Drag the mouse into the blank space of the document and a + sign will appear. Left click to   electronically sign the document.     Thank you,    aBrry SIEGEL

## 2024-06-13 NOTE — TELEPHONE ENCOUNTER
Called and spoke to patient. Details were provided.    Type of Leave: Family Medical Leave Act continuous  Reason for Leave: Pregnancy  Start date of leave: Start: 6/7/24 End: 7/19/24  Return to work 7/22/24  How much time needed?:   Forms Due Date:  Was Fee and Turnaround info Given?: Yes

## 2024-06-17 NOTE — TELEPHONE ENCOUNTER
Please try to avoid signing forms in the corner as it is not visible when printing and forms are not accepted this way. Thank you!                                                                         2 FORMS NEED SIGNATURE !     Dr. Shane     *The ACKNOWLEDGE button has been moved to the top right ribbon*     Please sign off on form if you agree to: Family Medical Leave Act   Start: 6/7/24  End: 7/19/24  Return to work: 7/22/24  AND   Fitness for Duty  (place your signature on the first page only)     -From your Inbasket, Highlight the patient and click Chart   -Double click the 6/11/24 Forms Completion telephone encounter  -Scroll down to the Media section   -Click the blue Hyperlink: Family Medical Leave Act Dr. Shane 6/13/24  Fitness For Duty  Dr. Shane 6/13/24  -Click Acknowledge located in the top right ribbon/menu   -Drag the mouse into the blank space of the document and a + sign will appear. Left click to   electronically sign the document.     Thank you,     Barry SIEGEL

## 2024-06-19 NOTE — TELEPHONE ENCOUNTER
Please try to avoid signing forms in the corner as it is not visible when printing and forms are not accepted this way. Thank you!                                                        Family Medical Leave Act FORMS NEED SIGNATURE !     Dr. Shane     *The ACKNOWLEDGE button has been moved to the top right ribbon*     Please sign off on form if you agree to: Family Medical Leave Act   Start: 6/7/24  End: 7/19/24  Return to work: 7/22/24  AND   Fitness for Duty  (place your signature on the first page only)     -From your Inbasket, Highlight the patient and click Chart   -Double click the 6/11/24 Forms Completion telephone encounter  -Scroll down to the Media section   -Click the blue Hyperlink: Family Medical Leave Act Dr. Shane 6/13/24  Fitness For Duty  Dr. Shane 6/13/24  -Click Acknowledge located in the top right ribbon/menu   -Drag the mouse into the blank space of the document and a + sign will appear. Left click to   electronically sign the document.     Thank you,     Barry SIEGEL

## 2024-06-19 NOTE — TELEPHONE ENCOUNTER
Patient called to get status on forms. Advised patient forms are pending providers signature. Patient expressed understanding.

## 2024-06-19 NOTE — TELEPHONE ENCOUNTER
Patient calling to get status of [place] paperwork, she is asking for this ASAP.  Baby was born on 6/7.  The forms department is stating they requested Dr. Perez input on 6/13, 6/17 & 6/19.    Please advise

## 2024-06-20 NOTE — TELEPHONE ENCOUNTER
Called and Left voicemail informing patient forms completed and signed by provider. No Release of Information on file - forms uploaded to [employer]

## 2024-07-10 ENCOUNTER — TELEPHONE (OUTPATIENT)
Dept: OBGYN CLINIC | Facility: CLINIC | Age: 34
End: 2024-07-10

## 2024-07-10 NOTE — TELEPHONE ENCOUNTER
Patient called stating HR requiring updated Return to work after 30 days, possible revision with current date only.  No other changes. Asked patient to call back to confirm if stamp only will meet requirement.

## 2024-07-11 NOTE — TELEPHONE ENCOUNTER
Called and Left voicemail for patient. Details needed for Fitness for Duty.  Please get details when patient calls.

## 2024-07-12 ENCOUNTER — TELEPHONE (OUTPATIENT)
Age: 34
End: 2024-07-12

## 2024-07-12 NOTE — TELEPHONE ENCOUNTER
I am reaching out from the [facility] department, following up on an order from your provider's office to assist in connecting you with resources for care. If you would like to discuss this further, please give us a call back at [phone], or for more immediate assistance you can contact our 24-hour help line at [phone]. We look forward to hearing from you soon.

## 2024-07-19 ENCOUNTER — PATIENT MESSAGE (OUTPATIENT)
Dept: OBGYN CLINIC | Facility: CLINIC | Age: 34
End: 2024-07-19

## 2024-07-19 DIAGNOSIS — N39.0 URINARY TRACT INFECTION WITHOUT HEMATURIA, SITE UNSPECIFIED: Primary | ICD-10-CM

## 2024-07-22 RX ORDER — SULFAMETHOXAZOLE AND TRIMETHOPRIM 800; 160 MG/1; MG/1
1 TABLET ORAL 2 TIMES DAILY
Qty: 6 TABLET | Refills: 0 | Status: SHIPPED | OUTPATIENT
Start: 2024-07-22 | End: 2024-07-25

## 2024-07-23 ENCOUNTER — LAB ENCOUNTER (OUTPATIENT)
Dept: LAB | Age: 34
End: 2024-07-23
Attending: OBSTETRICS & GYNECOLOGY
Payer: COMMERCIAL

## 2024-07-23 ENCOUNTER — TELEPHONE (OUTPATIENT)
Age: 34
End: 2024-07-23

## 2024-07-23 DIAGNOSIS — N39.0 URINARY TRACT INFECTION WITHOUT HEMATURIA, SITE UNSPECIFIED: ICD-10-CM

## 2024-07-23 PROCEDURE — 87086 URINE CULTURE/COLONY COUNT: CPT

## 2024-07-23 NOTE — TELEPHONE ENCOUNTER
Bobby Slaughter - I am reaching out from the [facility] department, following up on an order from your provider's office to assist in connecting you with resources for care. If you would like to discuss this further, please give us a call at [phone], or for more immediate assistance you can contact our 24-hour help line at [phone]. We look forward to hearing from you soon.

## (undated) NOTE — LETTER
[facility]  Administration of Anesthesia  I, Kasandra Dyernandez agree to be cared for by a physician anesthesiologist alone and/or with a nurse anesthetist, who is specially trained to monitor me and give me medicine to put me to sleep or keep me comfortable during my procedure    I understand that my anesthesiologist and/or anesthetist is not an employee or agent of [facility] or [employer]. He or she works for [facility]    As the patient asking for anesthesia services, I agree to:  Allow the anesthesiologist (anesthesia doctor) to give me medicine and do additional procedures as necessary. Some examples are: Starting or using an “IV” to give me medicine, fluids or blood during my procedure, and having a breathing tube placed to help me breathe when I’m asleep (intubation). In the event that my heart stops working properly, I understand that my anesthesiologist will make every effort to sustain my life, unless otherwise directed by [facility] Do Not Resuscitate documents.  Tell my anesthesia doctor before my procedure:  If I am pregnant.  The last time that I ate or drank.  iii. All of the medicines I take (including prescriptions, herbal supplements, and pills I can buy without a prescription (including street drugs/illegal medications). Failure to inform my anesthesiologist about these medicines may increase my risk of anesthetic complications.  iv.If I am allergic to anything or have had a reaction to anesthesia before.  I understand how the anesthesia medicine will help me (benefits).  I understand that with any type of anesthesia medicine there are risks:  The most common risks are: nausea, vomiting, sore throat, muscle soreness, damage to my eyes, mouth, or teeth (from breathing tube placement).  Rare risks include: remembering what happened during my procedure, allergic reactions to medications, injury to my airway, heart, lungs, vision, nerves, or  muscles and in extremely rare instances death.  My doctor has explained to me other choices available to me for my care (alternatives).  Pregnant Patients (“epidural”):  I understand that the risks of having an epidural (medicine given into my back to help control pain during labor), include itching, low blood pressure, difficulty urinating, headache or slowing of the baby’s heart. Very rare risks include infection, bleeding, seizure, irregular heart rhythms and nerve injury.  Regional Anesthesia (“spinal”, “epidural”, & “nerve blocks”):  I understand that rare but potential complications include headache, bleeding, infection, seizure, irregular heart rhythms, and nerve injury.    _____________________________________________________________________________  Patient (or Representative) Signature/Relationship to Patient  Date   Time    _____________________________________________________________________________   Name (if used)    Language/Organization   Time    _____________________________________________________________________________  Nurse Anesthetist Signature     Date   Time  _____________________________________________________________________________  Anesthesiologist Signature     Date   Time  I have discussed the procedure and information above with the patient (or patient’s representative) and answered their questions. The patient or their representative has agreed to have anesthesia services.    _____________________________________________________________________________  Witness        Date   Time  I have verified that the signature is that of the patient or patient’s representative, and that it was signed before the procedure  Patient Name: Kasandra Rodrigues     : 1990                 Printed: 2024 at 8:14 AM    Medical Record #: [id number]                                            Page 1 of 1  ----------ANESTHESIA CONSENT----------